# Patient Record
Sex: FEMALE | Race: OTHER | HISPANIC OR LATINO
[De-identification: names, ages, dates, MRNs, and addresses within clinical notes are randomized per-mention and may not be internally consistent; named-entity substitution may affect disease eponyms.]

---

## 2019-01-17 ENCOUNTER — APPOINTMENT (OUTPATIENT)
Dept: SURGERY | Facility: CLINIC | Age: 29
End: 2019-01-17

## 2019-01-25 ENCOUNTER — APPOINTMENT (OUTPATIENT)
Dept: BREAST CENTER | Facility: CLINIC | Age: 29
End: 2019-01-25
Payer: COMMERCIAL

## 2019-01-25 PROCEDURE — 99204 OFFICE O/P NEW MOD 45 MIN: CPT

## 2019-01-29 ENCOUNTER — APPOINTMENT (OUTPATIENT)
Dept: INTERNAL MEDICINE | Facility: CLINIC | Age: 29
End: 2019-01-29

## 2019-02-01 NOTE — PHYSICAL EXAM
[Normocephalic] : normocephalic [Atraumatic] : atraumatic [EOMI] : extra ocular movement intact [PERRL] : pupils equal, round and reactive to light [Sclera nonicteric] : sclera nonicteric [Supple] : supple [No Supraclavicular Adenopathy] : no supraclavicular adenopathy [Examined in the supine and seated position] : examined in the supine and seated position [No dominant masses] : no dominant masses in right breast  [No dominant masses] : no dominant masses left breast [No Nipple Retraction] : no left nipple retraction [No Nipple Discharge] : no left nipple discharge [Breast Nipple Inversion] : nipples not inverted [Breast Nipple Retraction] : nipples not retracted [Breast Nipple Flattening] : nipples not flattened [Breast Nipple Fissures] : nipples not fissured [Breast Abnormal Lactation (Galactorrhea)] : no galactorrhea [Breast Abnormal Secretion Bloody Fluid] : no bloody discharge [Breast Abnormal Secretion Serous Fluid] : no serous discharge [Breast Abnormal Secretion Opalescent Fluid] : no milky discharge [No Axillary Lymphadenopathy] : no left axillary lymphadenopathy [No Edema] : no edema [No Rashes] : no rashes [No Ulceration] : no ulceration [de-identified] : No supraclavicular or axillary adenopathy. No dominant masses, normal to palpation. Everted nipple without discharge. No skin changes.\par \par  [de-identified] : No supraclavicular or axillary adenopathy. No dominant masses, normal to palpation. Everted nipple without discharge. No skin changes.\par \par

## 2019-02-01 NOTE — ASSESSMENT
[FreeTextEntry1] : 29 yo female presents with palpable right breast lump.  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  There is no dominant masses palpable on exam today.  Recommendation for repeat ultrasound in March 2019 and followup in 6 months.\par 1.  Bilateral breast ultrasound in 6 months\par 2.  Follow up in 6 months\par 3.  Annual screening mammogram to begin at age 38

## 2019-02-01 NOTE — PAST MEDICAL HISTORY
[Menstruating] : The patient is menstruating [Menarche Age ____] : age at menarche was [unfilled] [Total Preg ___] : G[unfilled] [Live Births ___] : P[unfilled]  [Age At Live Birth ___] : Age at live birth: [unfilled]
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
## 2019-02-06 ENCOUNTER — APPOINTMENT (OUTPATIENT)
Dept: INTERNAL MEDICINE | Facility: CLINIC | Age: 29
End: 2019-02-06
Payer: COMMERCIAL

## 2019-02-06 VITALS
SYSTOLIC BLOOD PRESSURE: 122 MMHG | BODY MASS INDEX: 26.68 KG/M2 | DIASTOLIC BLOOD PRESSURE: 81 MMHG | WEIGHT: 145 LBS | HEIGHT: 62 IN

## 2019-02-06 PROCEDURE — 99204 OFFICE O/P NEW MOD 45 MIN: CPT

## 2019-02-06 NOTE — ASSESSMENT
[FreeTextEntry1] : We had a long discussion about herpes virus family behavior that can stay dormant in body and causes issue with immunosuppression otherwise positive serology means contact in the past. \par Unless she develops HIV, cancer, need for transplant, ..... it doesn’t cause any issue. \par She was reassured. \par RTC PRN

## 2019-02-06 NOTE — PHYSICAL EXAM
[General Appearance - Alert] : alert [General Appearance - In No Acute Distress] : in no acute distress [Sclera] : the sclera and conjunctiva were normal [PERRL With Normal Accommodation] : pupils were equal in size, round, reactive to light [Extraocular Movements] : extraocular movements were intact [Outer Ear] : the ears and nose were normal in appearance [Oropharynx] : the oropharynx was normal with no thrush [Neck Appearance] : the appearance of the neck was normal [Neck Cervical Mass (___cm)] : no neck mass was observed [Jugular Venous Distention Increased] : there was no jugular-venous distention [Thyroid Diffuse Enlargement] : the thyroid was not enlarged [Auscultation Breath Sounds / Voice Sounds] : lungs were clear to auscultation bilaterally [Heart Rate And Rhythm] : heart rate was normal and rhythm regular [Heart Sounds] : normal S1 and S2 [Heart Sounds Gallop] : no gallops [Murmurs] : no murmurs [Heart Sounds Pericardial Friction Rub] : no pericardial rub [Full Pulse] : the pedal pulses are present [Edema] : there was no peripheral edema [Bowel Sounds] : normal bowel sounds [Abdomen Soft] : soft [Abdomen Tenderness] : non-tender [Abdomen Mass (___ Cm)] : no abdominal mass palpated [Costovertebral Angle Tenderness] : no CVA tenderness [No Palpable Adenopathy] : no palpable adenopathy [Musculoskeletal - Swelling] : no joint swelling [Nail Clubbing] : no clubbing  or cyanosis of the fingernails [Motor Tone] : muscle strength and tone were normal [Skin Color & Pigmentation] : normal skin color and pigmentation [] : no rash [Deep Tendon Reflexes (DTR)] : deep tendon reflexes were 2+ and symmetric [Sensation] : the sensory exam was normal to light touch and pinprick [No Focal Deficits] : no focal deficits [Oriented To Time, Place, And Person] : oriented to person, place, and time [Affect] : the affect was normal

## 2019-02-06 NOTE — HISTORY OF PRESENT ILLNESS
[FreeTextEntry1] : 27y/o woman with history of anxiety/depression was referred to ID for a positive CMV serology. \par She was seen by her PCP for neck lymphadenopathy (that has resolved) and on/off joint pain and had complete labs. ALT was 45 so complete tick born disease, Hepatitis panel and CMV and EBV serology were done. EBV came back old infection and CMV serology IgG and IgM positive. Hep panel neg. \par She came here for CMV+ serology. \par No symptoms, no fever. no lymphadenopathy at his time. Joint pain is all gone. She is just worried about her health. \par No immune system diseases, HIV neg and no cancer or chemo.

## 2019-02-09 ENCOUNTER — APPOINTMENT (OUTPATIENT)
Dept: ULTRASOUND IMAGING | Facility: CLINIC | Age: 29
End: 2019-02-09

## 2019-03-11 ENCOUNTER — APPOINTMENT (OUTPATIENT)
Dept: ULTRASOUND IMAGING | Facility: CLINIC | Age: 29
End: 2019-03-11
Payer: COMMERCIAL

## 2019-03-11 ENCOUNTER — OUTPATIENT (OUTPATIENT)
Dept: OUTPATIENT SERVICES | Facility: HOSPITAL | Age: 29
LOS: 1 days | End: 2019-03-11
Payer: COMMERCIAL

## 2019-03-11 DIAGNOSIS — Z00.8 ENCOUNTER FOR OTHER GENERAL EXAMINATION: ICD-10-CM

## 2019-03-11 PROCEDURE — 76641 ULTRASOUND BREAST COMPLETE: CPT

## 2019-03-11 PROCEDURE — 76641 ULTRASOUND BREAST COMPLETE: CPT | Mod: 26,50

## 2019-06-19 ENCOUNTER — APPOINTMENT (OUTPATIENT)
Dept: INTERNAL MEDICINE | Facility: CLINIC | Age: 29
End: 2019-06-19
Payer: COMMERCIAL

## 2019-06-19 VITALS
DIASTOLIC BLOOD PRESSURE: 60 MMHG | WEIGHT: 147 LBS | BODY MASS INDEX: 27.05 KG/M2 | SYSTOLIC BLOOD PRESSURE: 120 MMHG | HEIGHT: 62 IN

## 2019-06-19 PROCEDURE — 99213 OFFICE O/P EST LOW 20 MIN: CPT | Mod: 25

## 2019-06-19 PROCEDURE — 36415 COLL VENOUS BLD VENIPUNCTURE: CPT

## 2019-06-19 NOTE — HISTORY OF PRESENT ILLNESS
[FreeTextEntry1] : 29y/o woman with history of anxiety/depression who was seen few months ago for a positive CMV serology, is here for follow up with the same complaint. \par She was seen by her PCP for neck lymphadenopathy (that has resolved) and on/off joint pain and had complete labs. ALT was 45 so complete tick born disease, Hepatitis panel and CMV and EBV serology were done. EBV came back old infection and CMV serology IgG and IgM positive. Hep panel neg. \par No symptoms, no fever. no lymphadenopathy at his time. Joint pain is all gone. She is just worried about her health. \par No immune system diseases, HIV neg and no cancer or chemo.

## 2019-06-19 NOTE — ASSESSMENT
[Anticipatory Guidance] : anticipatory guidance [FreeTextEntry1] : I don't believe she has infectious causes for her fatigue but will repeat labs to make sure doesn’t have any Lyme, IMN and CMV. \par It could be her anxiety and possibly fibromyalgia. \par She had some rheum work up as well that were neg. \par Will see her in few weeks for results. \par

## 2019-06-19 NOTE — PHYSICAL EXAM
[General Appearance - Alert] : alert [General Appearance - In No Acute Distress] : in no acute distress [Sclera] : the sclera and conjunctiva were normal [PERRL With Normal Accommodation] : pupils were equal in size, round, reactive to light [Extraocular Movements] : extraocular movements were intact [Outer Ear] : the ears and nose were normal in appearance [Neck Cervical Mass (___cm)] : no neck mass was observed [Neck Appearance] : the appearance of the neck was normal [Oropharynx] : the oropharynx was normal with no thrush [Jugular Venous Distention Increased] : there was no jugular-venous distention [Thyroid Diffuse Enlargement] : the thyroid was not enlarged [Heart Sounds] : normal S1 and S2 [Auscultation Breath Sounds / Voice Sounds] : lungs were clear to auscultation bilaterally [Heart Rate And Rhythm] : heart rate was normal and rhythm regular [Murmurs] : no murmurs [Heart Sounds Pericardial Friction Rub] : no pericardial rub [Heart Sounds Gallop] : no gallops [Bowel Sounds] : normal bowel sounds [Full Pulse] : the pedal pulses are present [Edema] : there was no peripheral edema [Abdomen Soft] : soft [Abdomen Tenderness] : non-tender [Costovertebral Angle Tenderness] : no CVA tenderness [No Palpable Adenopathy] : no palpable adenopathy [Abdomen Mass (___ Cm)] : no abdominal mass palpated [Nail Clubbing] : no clubbing  or cyanosis of the fingernails [Musculoskeletal - Swelling] : no joint swelling [Deep Tendon Reflexes (DTR)] : deep tendon reflexes were 2+ and symmetric [Skin Color & Pigmentation] : normal skin color and pigmentation [Motor Tone] : muscle strength and tone were normal [] : no rash [Oriented To Time, Place, And Person] : oriented to person, place, and time [Sensation] : the sensory exam was normal to light touch and pinprick [No Focal Deficits] : no focal deficits [Affect] : the affect was normal

## 2019-06-28 LAB
ANA SER IF-ACNC: NEGATIVE
CMV IGG SERPL QL: >10 U/ML
CMV IGG SERPL-IMP: POSITIVE
CRP SERPL-MCNC: <0.1 MG/DL
HETEROPH AB SER QL: NEGATIVE

## 2019-07-10 ENCOUNTER — RX RENEWAL (OUTPATIENT)
Age: 29
End: 2019-07-10

## 2019-07-10 LAB
B BURGDOR AB SER-IMP: NEGATIVE
B BURGDOR IGM PATRN SER IB-IMP: NEGATIVE
B BURGDOR18KD IGG SER QL IB: NORMAL
B BURGDOR23KD IGG SER QL IB: NORMAL
B BURGDOR23KD IGM SER QL IB: NORMAL
B BURGDOR28KD IGG SER QL IB: NORMAL
B BURGDOR30KD IGG SER QL IB: NORMAL
B BURGDOR31KD IGG SER QL IB: NORMAL
B BURGDOR39KD IGG SER QL IB: NORMAL
B BURGDOR39KD IGM SER QL IB: NORMAL
B BURGDOR41KD IGG SER QL IB: PRESENT
B BURGDOR41KD IGM SER QL IB: PRESENT
B BURGDOR45KD IGG SER QL IB: NORMAL
B BURGDOR58KD IGG SER QL IB: NORMAL
B BURGDOR66KD IGG SER QL IB: NORMAL
B BURGDOR93KD IGG SER QL IB: NORMAL

## 2019-07-16 ENCOUNTER — APPOINTMENT (OUTPATIENT)
Dept: SURGERY | Facility: CLINIC | Age: 29
End: 2019-07-16

## 2019-10-01 ENCOUNTER — APPOINTMENT (OUTPATIENT)
Dept: SURGERY | Facility: CLINIC | Age: 29
End: 2019-10-01

## 2020-09-09 ENCOUNTER — APPOINTMENT (OUTPATIENT)
Dept: INTERNAL MEDICINE | Facility: CLINIC | Age: 30
End: 2020-09-09

## 2020-12-17 ENCOUNTER — APPOINTMENT (OUTPATIENT)
Dept: OBGYN | Facility: CLINIC | Age: 30
End: 2020-12-17

## 2021-03-22 ENCOUNTER — APPOINTMENT (OUTPATIENT)
Dept: OBGYN | Facility: CLINIC | Age: 31
End: 2021-03-22
Payer: COMMERCIAL

## 2021-03-22 PROCEDURE — ZZZZZ: CPT

## 2021-04-07 ENCOUNTER — APPOINTMENT (OUTPATIENT)
Dept: ANTEPARTUM | Facility: CLINIC | Age: 31
End: 2021-04-07
Payer: COMMERCIAL

## 2021-04-07 PROCEDURE — 99072 ADDL SUPL MATRL&STAF TM PHE: CPT

## 2021-04-07 PROCEDURE — 76801 OB US < 14 WKS SINGLE FETUS: CPT

## 2021-04-18 ENCOUNTER — EMERGENCY (EMERGENCY)
Facility: HOSPITAL | Age: 31
LOS: 1 days | Discharge: HOME | End: 2021-04-18
Admitting: EMERGENCY MEDICINE
Payer: COMMERCIAL

## 2021-04-18 VITALS
SYSTOLIC BLOOD PRESSURE: 112 MMHG | OXYGEN SATURATION: 100 % | TEMPERATURE: 98 F | DIASTOLIC BLOOD PRESSURE: 70 MMHG | HEART RATE: 92 BPM | RESPIRATION RATE: 16 BRPM

## 2021-04-18 PROCEDURE — 99285 EMERGENCY DEPT VISIT HI MDM: CPT

## 2021-04-18 NOTE — ED PROVIDER NOTE - CLINICAL SUMMARY MEDICAL DECISION MAKING FREE TEXT BOX
29 y/o female 11.5 weeks pregnant w/ spotting x1 time today, + IUP 2 weeks ago. Spoke with GYN resident, ok for FHR and DC. Pt to see Jessica Mock in 2 days. Advised to come back to the ER if bleeding or pain becomes worse.

## 2021-04-18 NOTE — ED PROVIDER NOTE - PATIENT PORTAL LINK FT
You can access the FollowMyHealth Patient Portal offered by [facility] by registering at the following website: [email or web address]. By joining [employer]’s FollowMyHealth portal, you will also be able to view your health information using other applications (apps) compatible with our system.

## 2021-04-18 NOTE — ED ADULT TRIAGE NOTE - CHIEF COMPLAINT QUOTE
Pt. 12wks pregnant, c/o vaginal spotting, lower abdominal pressure and lower back pain starting today. Advised to come to ER for US. Denies n/v. h/o miscarriage 10 years ago. EDINSON 11/4/21.

## 2021-04-18 NOTE — ED PROVIDER NOTE - OBJECTIVE STATEMENT
31 y/o female, , 11.5 weeks pregnant c/o spotting x today. Pt admits to noticing small amount of spotting this AM. Pt called her OB Dr. Lopez who stated that pt can come to the office in 2 days for f/u but rec to go to the ER for FHR check to make sure everything is ok. Pt denies abd cramping, heavy bleeding, dizziness, syncope, fever or chills.

## 2021-04-20 ENCOUNTER — APPOINTMENT (OUTPATIENT)
Dept: ANTEPARTUM | Facility: CLINIC | Age: 31
End: 2021-04-20
Payer: COMMERCIAL

## 2021-04-20 PROCEDURE — 76801 OB US < 14 WKS SINGLE FETUS: CPT

## 2021-04-20 PROCEDURE — 99072 ADDL SUPL MATRL&STAF TM PHE: CPT

## 2021-05-19 ENCOUNTER — APPOINTMENT (OUTPATIENT)
Dept: OBGYN | Facility: CLINIC | Age: 31
End: 2021-05-19
Payer: COMMERCIAL

## 2021-05-19 ENCOUNTER — NON-APPOINTMENT (OUTPATIENT)
Age: 31
End: 2021-05-19

## 2021-05-19 PROCEDURE — 0502F SUBSEQUENT PRENATAL CARE: CPT

## 2021-06-16 ENCOUNTER — APPOINTMENT (OUTPATIENT)
Dept: ANTEPARTUM | Facility: CLINIC | Age: 31
End: 2021-06-16
Payer: COMMERCIAL

## 2021-06-16 PROCEDURE — 76805 OB US >/= 14 WKS SNGL FETUS: CPT

## 2021-06-16 PROCEDURE — 99072 ADDL SUPL MATRL&STAF TM PHE: CPT

## 2021-07-21 ENCOUNTER — APPOINTMENT (OUTPATIENT)
Dept: OBGYN | Facility: CLINIC | Age: 31
End: 2021-07-21
Payer: COMMERCIAL

## 2021-07-21 PROCEDURE — 0502F SUBSEQUENT PRENATAL CARE: CPT

## 2021-07-29 ENCOUNTER — OUTPATIENT (OUTPATIENT)
Dept: INPATIENT UNIT | Facility: HOSPITAL | Age: 31
LOS: 1 days | Discharge: HOME | End: 2021-07-29

## 2021-07-29 VITALS
DIASTOLIC BLOOD PRESSURE: 60 MMHG | TEMPERATURE: 98 F | SYSTOLIC BLOOD PRESSURE: 118 MMHG | RESPIRATION RATE: 16 BRPM | HEART RATE: 83 BPM

## 2021-07-29 VITALS — DIASTOLIC BLOOD PRESSURE: 54 MMHG | SYSTOLIC BLOOD PRESSURE: 107 MMHG | HEART RATE: 83 BPM | TEMPERATURE: 98 F

## 2021-07-29 DIAGNOSIS — Z98.890 OTHER SPECIFIED POSTPROCEDURAL STATES: Chronic | ICD-10-CM

## 2021-07-29 DIAGNOSIS — Z3A.00 WEEKS OF GESTATION OF PREGNANCY NOT SPECIFIED: ICD-10-CM

## 2021-07-29 DIAGNOSIS — Z98.891 HISTORY OF UTERINE SCAR FROM PREVIOUS SURGERY: Chronic | ICD-10-CM

## 2021-07-29 DIAGNOSIS — O26.899 OTHER SPECIFIED PREGNANCY RELATED CONDITIONS, UNSPECIFIED TRIMESTER: ICD-10-CM

## 2021-07-29 LAB
APPEARANCE UR: CLEAR — SIGNIFICANT CHANGE UP
APTT BLD: 26.4 SEC — LOW (ref 27–36.3)
BACTERIA # UR AUTO: ABNORMAL
BILIRUB UR-MCNC: NEGATIVE — SIGNIFICANT CHANGE UP
BLD GP AB SCN SERPL QL: NEGATIVE — SIGNIFICANT CHANGE UP
COLOR SPEC: SIGNIFICANT CHANGE UP
DIFF PNL FLD: NEGATIVE — SIGNIFICANT CHANGE UP
EPI CELLS # UR: 3 /HPF — SIGNIFICANT CHANGE UP (ref 0–5)
FIBRINOGEN PPP-MCNC: 468 MG/DL — SIGNIFICANT CHANGE UP (ref 290–520)
GLUCOSE UR QL: NEGATIVE — SIGNIFICANT CHANGE UP
HCT VFR BLD CALC: 36.4 % — SIGNIFICANT CHANGE UP (ref 34.5–45)
HGB BLD-MCNC: 12.4 G/DL — SIGNIFICANT CHANGE UP (ref 11.5–15.5)
HYALINE CASTS # UR AUTO: 2 /LPF — SIGNIFICANT CHANGE UP (ref 0–7)
INR BLD: 0.97 RATIO — SIGNIFICANT CHANGE UP (ref 0.88–1.16)
KETONES UR-MCNC: NEGATIVE — SIGNIFICANT CHANGE UP
LEUKOCYTE ESTERASE UR-ACNC: ABNORMAL
MCHC RBC-ENTMCNC: 30.2 PG — SIGNIFICANT CHANGE UP (ref 27–34)
MCHC RBC-ENTMCNC: 34.1 GM/DL — SIGNIFICANT CHANGE UP (ref 32–36)
MCV RBC AUTO: 88.8 FL — SIGNIFICANT CHANGE UP (ref 80–100)
NITRITE UR-MCNC: NEGATIVE — SIGNIFICANT CHANGE UP
NRBC # BLD: 0 /100 WBCS — SIGNIFICANT CHANGE UP
NRBC # FLD: 0 K/UL — SIGNIFICANT CHANGE UP
PH UR: 7.5 — SIGNIFICANT CHANGE UP (ref 5–8)
PLATELET # BLD AUTO: 245 K/UL — SIGNIFICANT CHANGE UP (ref 150–400)
PROT UR-MCNC: NEGATIVE — SIGNIFICANT CHANGE UP
PROTHROM AB SERPL-ACNC: 11.1 SEC — SIGNIFICANT CHANGE UP (ref 10.6–13.6)
RBC # BLD: 4.1 M/UL — SIGNIFICANT CHANGE UP (ref 3.8–5.2)
RBC # FLD: 12.8 % — SIGNIFICANT CHANGE UP (ref 10.3–14.5)
RBC CASTS # UR COMP ASSIST: 1 /HPF — SIGNIFICANT CHANGE UP (ref 0–4)
RH IG SCN BLD-IMP: POSITIVE — SIGNIFICANT CHANGE UP
SP GR SPEC: 1.01 — LOW (ref 1.01–1.02)
UROBILINOGEN FLD QL: SIGNIFICANT CHANGE UP
WBC # BLD: 9.4 K/UL — SIGNIFICANT CHANGE UP (ref 3.8–10.5)
WBC # FLD AUTO: 9.4 K/UL — SIGNIFICANT CHANGE UP (ref 3.8–10.5)
WBC UR QL: 7 /HPF — HIGH (ref 0–5)
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
## 2021-07-29 NOTE — OB PROVIDER TRIAGE NOTE - ADDITIONAL INSTRUCTIONS
d/w Dr Loyola d/c home s/p fall no complaints no evidence of PTL or abruption @ 25 weeks  maternal and fetal surveillance reassuring  rest activity as tolerated  increase water intake  keep all OB appointments  may take tylenol by mouth as needed for pain  return for vaginal bleeding leakage of fluid decreased fetal movement or any concerns  v/w discharge instructions given with verbal understanding by patient

## 2021-07-29 NOTE — OB PROVIDER TRIAGE NOTE - NSHPLABSRESULTS_GEN_ALL_CORE
Urinalysis Basic - ( 2021 20:06 )    Color: Light Yellow / Appearance: Clear / S.009 / pH: x  Gluc: x / Ketone: Negative  / Bili: Negative / Urobili: <2 mg/dL   Blood: x / Protein: Negative / Nitrite: Negative   Leuk Esterase: Small / RBC: 1 /HPF / WBC 7 /HPF   Sq Epi: x / Non Sq Epi: 3 /HPF / Bacteria: Moderate                          12.4   9.40  )-----------( 245      ( 2021 20:06 )             36.4   PT/INR - ( 2021 20:06 )   PT: 11.1 sec;   INR: 0.97 ratio         PTT - ( 2021 20:06 )  PTT:26.4 sec
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
## 2021-07-29 NOTE — OB PROVIDER TRIAGE NOTE - NSHPPHYSICALEXAM_GEN_ALL_CORE
LS clear bilaterally  abd soft gravid NT  CV RRR  FHT: moderate variability, +accels, negative decels  toco: none  Vital Signs Last 24 Hrs  T(C): 36.6 (29 Jul 2021 19:27), Max: 36.6 (29 Jul 2021 19:27)  T(F): 97.88 (29 Jul 2021 19:27), Max: 97.88 (29 Jul 2021 19:27)  HR: 83 (29 Jul 2021 19:27) (83 - 83)  BP: 107/54 (29 Jul 2021 19:27) (107/54 - 118/60)  BP(mean): --  RR: 16 (29 Jul 2021 19:03) (16 - 16)  SpO2: -- LS clear bilaterally  abd soft gravid NT  CV RRR  TAS: vertex  anterior placenta  +FH +FM  ZACKARY: MFP:>3  EFW: 1#13/822g  SSE: cx appears closed, no bleeding or pooling of fluid noted  FHT: moderate variability, +accels, negative decels  toco: 2 contractions in 4 hr, not felt denies pain 0/10 on pain scale  Vital Signs Last 24 Hrs  T(C): 36.6 (29 Jul 2021 19:27), Max: 36.6 (29 Jul 2021 19:27)  T(F): 97.88 (29 Jul 2021 19:27), Max: 97.88 (29 Jul 2021 19:27)  HR: 83 (29 Jul 2021 19:27) (83 - 83)  BP: 107/54 (29 Jul 2021 19:27) (107/54 - 118/60)  BP(mean): --  RR: 16 (29 Jul 2021 19:03) (16 - 16)  SpO2: --

## 2021-08-11 ENCOUNTER — APPOINTMENT (OUTPATIENT)
Dept: OBGYN | Facility: CLINIC | Age: 31
End: 2021-08-11
Payer: COMMERCIAL

## 2021-08-11 PROCEDURE — 0502F SUBSEQUENT PRENATAL CARE: CPT

## 2021-08-25 ENCOUNTER — APPOINTMENT (OUTPATIENT)
Dept: ANTEPARTUM | Facility: CLINIC | Age: 31
End: 2021-08-25
Payer: COMMERCIAL

## 2021-08-25 VITALS — BODY MASS INDEX: 33.11 KG/M2 | SYSTOLIC BLOOD PRESSURE: 128 MMHG | WEIGHT: 181 LBS | DIASTOLIC BLOOD PRESSURE: 73 MMHG

## 2021-08-25 PROCEDURE — 76819 FETAL BIOPHYS PROFIL W/O NST: CPT

## 2021-08-25 PROCEDURE — 76816 OB US FOLLOW-UP PER FETUS: CPT

## 2021-09-08 ENCOUNTER — APPOINTMENT (OUTPATIENT)
Dept: OBGYN | Facility: CLINIC | Age: 31
End: 2021-09-08

## 2021-09-13 ENCOUNTER — OUTPATIENT (OUTPATIENT)
Dept: INPATIENT UNIT | Facility: HOSPITAL | Age: 31
LOS: 1 days | Discharge: HOME | End: 2021-09-13
Payer: COMMERCIAL

## 2021-09-13 VITALS
RESPIRATION RATE: 16 BRPM | TEMPERATURE: 98 F | HEART RATE: 85 BPM | SYSTOLIC BLOOD PRESSURE: 99 MMHG | DIASTOLIC BLOOD PRESSURE: 53 MMHG

## 2021-09-13 DIAGNOSIS — Z98.890 OTHER SPECIFIED POSTPROCEDURAL STATES: Chronic | ICD-10-CM

## 2021-09-13 DIAGNOSIS — O26.899 OTHER SPECIFIED PREGNANCY RELATED CONDITIONS, UNSPECIFIED TRIMESTER: ICD-10-CM

## 2021-09-13 DIAGNOSIS — Z98.891 HISTORY OF UTERINE SCAR FROM PREVIOUS SURGERY: Chronic | ICD-10-CM

## 2021-09-13 DIAGNOSIS — Z3A.00 WEEKS OF GESTATION OF PREGNANCY NOT SPECIFIED: ICD-10-CM

## 2021-09-13 NOTE — OB RN TRIAGE NOTE - NSICDXPASTMEDICALHX_GEN_ALL_CORE_FT
PAST MEDICAL HISTORY:  HSV-1 infection     Rheumatoid arthritis On Planquinil for last few months

## 2021-09-13 NOTE — OB PROVIDER TRIAGE NOTE - ADDITIONAL INSTRUCTIONS
- Low ZACKARY, pt instructed to follow up in Dr. Lopez's office on Thursday  - increase hydration  - fetal kick count  - S/S of PTL  - maternity belt

## 2021-09-13 NOTE — OB PROVIDER TRIAGE NOTE - NSHPPHYSICALEXAM_GEN_ALL_CORE
Alert and Oriented x 3  Lung CTAB  Heart RRR  Abdomen gravid soft and nontender    FHR: 125  Contractions: none   CAT I     Sono:  BPP 8/8  presentation: vertex    placenta: anterior   ZACKARY: 6.19    Speculum Exam:  small 0.5cm cervical polyp in cervical os  cervix appears closed     Vaginal Exam: 0/20/-4    Vital Signs Last 24 Hrs  T(C): 36.7 (13 Sep 2021 21:50), Max: 36.7 (13 Sep 2021 21:50)  T(F): 98.1 (13 Sep 2021 21:50), Max: 98.1 (13 Sep 2021 21:50)  HR: 88 (14 Sep 2021 00:01) (84 - 88)  BP: 105/54 (14 Sep 2021 00:01) (99/53 - 113/54)  RR: 16 (13 Sep 2021 21:50) (16 - 16)

## 2021-09-13 NOTE — OB RN TRIAGE NOTE - NS_OBGYNHISTORY_OBGYN_ALL_OB_FT
7/13/2009 c/s from  Non reassuring tracing, Cord Around the neck ,  and Pre-Eclampsia FT M 7#13  SAB X1 with no D&C complete

## 2021-09-13 NOTE — OB PROVIDER TRIAGE NOTE - NSOBPROVIDERNOTE_OBGYN_ALL_OB_FT
Discussed with Dr. Harkins - no evidence of PTL noted  - Low ZACKARY, pt instructed to follow up in Dr. Lopez's office on Thursday  - increase hydration  - fetal kick count  - S/S of PTL  - maternity belt

## 2021-09-13 NOTE — OB PROVIDER TRIAGE NOTE - NSHPLABSRESULTS_GEN_ALL_CORE
Urinalysis Basic - ( 14 Sep 2021 00:05 )    Color: Light Yellow / Appearance: Slightly Turbid / S.009 / pH: x  Gluc: x / Ketone: Negative  / Bili: Negative / Urobili: <2 mg/dL   Blood: x / Protein: Negative / Nitrite: Negative   Leuk Esterase: Moderate / RBC: 1 /HPF / WBC 12 /HPF   Sq Epi: x / Non Sq Epi: 5 /HPF / Bacteria: Moderate

## 2021-09-13 NOTE — OB PROVIDER TRIAGE NOTE - HISTORY OF PRESENT ILLNESS
29 y/o F  @ 32.5 weeks  presented to triage with pain in her lower abdomen. Pt states she feel like she pulled something and it hurts to walk. Pt also feels like someone is stretching my muscle. Pt states the pain is intermittent and rates it a 5/10 on the pain scale. Pt endorses +FM. Denies any LOF, VB or cramping at the moment.     AP: anemia   PMH: + RA markers,          + HSV I           hx: anxiety- therapist in the past        31 y/o F  @ 32.5 weeks  presented to triage with pain in her lower abdomen. Pt states she feel like she pulled something and it hurts to walk. Pt also feels like someone is stretching my muscle. Pt states the pain is intermittent and rates it a 5/10 on the pain scale. Pt endorses +FM. Denies any LOF, VB or cramping at the moment.     AP: anemia   PMH: + RA markers on Plaquenil 200mg BID           + HSV I           hx: anxiety- therapist in the past

## 2021-09-14 VITALS — SYSTOLIC BLOOD PRESSURE: 105 MMHG | DIASTOLIC BLOOD PRESSURE: 54 MMHG | HEART RATE: 88 BPM

## 2021-09-14 LAB
APPEARANCE UR: ABNORMAL
BACTERIA # UR AUTO: ABNORMAL
BILIRUB UR-MCNC: NEGATIVE — SIGNIFICANT CHANGE UP
COLOR SPEC: SIGNIFICANT CHANGE UP
DIFF PNL FLD: NEGATIVE — SIGNIFICANT CHANGE UP
EPI CELLS # UR: 5 /HPF — SIGNIFICANT CHANGE UP (ref 0–5)
GLUCOSE UR QL: NEGATIVE — SIGNIFICANT CHANGE UP
HYALINE CASTS # UR AUTO: 2 /LPF — SIGNIFICANT CHANGE UP (ref 0–7)
KETONES UR-MCNC: NEGATIVE — SIGNIFICANT CHANGE UP
LEUKOCYTE ESTERASE UR-ACNC: ABNORMAL
NITRITE UR-MCNC: NEGATIVE — SIGNIFICANT CHANGE UP
PH UR: 7.5 — SIGNIFICANT CHANGE UP (ref 5–8)
PROT UR-MCNC: NEGATIVE — SIGNIFICANT CHANGE UP
RBC CASTS # UR COMP ASSIST: 1 /HPF — SIGNIFICANT CHANGE UP (ref 0–4)
SP GR SPEC: 1.01 — SIGNIFICANT CHANGE UP (ref 1–1.05)
UROBILINOGEN FLD QL: SIGNIFICANT CHANGE UP
WBC UR QL: 12 /HPF — HIGH (ref 0–5)

## 2021-09-14 PROCEDURE — 76817 TRANSVAGINAL US OBSTETRIC: CPT | Mod: 26

## 2021-09-14 PROCEDURE — 76819 FETAL BIOPHYS PROFIL W/O NST: CPT | Mod: 26

## 2021-09-14 PROCEDURE — 99205 OFFICE O/P NEW HI 60 MIN: CPT | Mod: 25

## 2021-09-14 PROCEDURE — 76805 OB US >/= 14 WKS SNGL FETUS: CPT | Mod: 26

## 2021-09-16 ENCOUNTER — APPOINTMENT (OUTPATIENT)
Dept: ANTEPARTUM | Facility: CLINIC | Age: 31
End: 2021-09-16
Payer: COMMERCIAL

## 2021-09-16 ENCOUNTER — APPOINTMENT (OUTPATIENT)
Dept: OBGYN | Facility: CLINIC | Age: 31
End: 2021-09-16
Payer: COMMERCIAL

## 2021-09-16 VITALS — SYSTOLIC BLOOD PRESSURE: 115 MMHG | WEIGHT: 185 LBS | DIASTOLIC BLOOD PRESSURE: 61 MMHG | BODY MASS INDEX: 33.84 KG/M2

## 2021-09-16 PROCEDURE — 76816 OB US FOLLOW-UP PER FETUS: CPT

## 2021-09-16 PROCEDURE — 76819 FETAL BIOPHYS PROFIL W/O NST: CPT

## 2021-09-16 PROCEDURE — 0502F SUBSEQUENT PRENATAL CARE: CPT

## 2021-09-29 ENCOUNTER — APPOINTMENT (OUTPATIENT)
Dept: ANTEPARTUM | Facility: CLINIC | Age: 31
End: 2021-09-29
Payer: COMMERCIAL

## 2021-09-29 VITALS — BODY MASS INDEX: 34.2 KG/M2 | WEIGHT: 187 LBS | DIASTOLIC BLOOD PRESSURE: 76 MMHG | SYSTOLIC BLOOD PRESSURE: 119 MMHG

## 2021-09-29 PROBLEM — B00.9 HERPESVIRAL INFECTION, UNSPECIFIED: Chronic | Status: ACTIVE | Noted: 2021-09-13

## 2021-09-29 PROBLEM — M06.9 RHEUMATOID ARTHRITIS, UNSPECIFIED: Chronic | Status: ACTIVE | Noted: 2021-09-13

## 2021-09-29 PROCEDURE — 0502F SUBSEQUENT PRENATAL CARE: CPT

## 2021-10-05 ENCOUNTER — APPOINTMENT (OUTPATIENT)
Dept: OBGYN | Facility: CLINIC | Age: 31
End: 2021-10-05
Payer: COMMERCIAL

## 2021-10-05 DIAGNOSIS — Z00.00 ENCOUNTER FOR GENERAL ADULT MEDICAL EXAMINATION W/OUT ABNORMAL FINDINGS: ICD-10-CM

## 2021-10-05 PROCEDURE — 36415 COLL VENOUS BLD VENIPUNCTURE: CPT

## 2021-10-06 LAB
ALBUMIN SERPL ELPH-MCNC: 3.6 G/DL
ALP BLD-CCNC: 107 U/L
ALT SERPL-CCNC: 31 U/L
ANION GAP SERPL CALC-SCNC: 14 MMOL/L
AST SERPL-CCNC: 25 U/L
BILIRUB SERPL-MCNC: 0.2 MG/DL
BUN SERPL-MCNC: 7 MG/DL
CALCIUM SERPL-MCNC: 9.8 MG/DL
CHLORIDE SERPL-SCNC: 103 MMOL/L
CO2 SERPL-SCNC: 21 MMOL/L
CREAT SERPL-MCNC: 0.56 MG/DL
GLUCOSE SERPL-MCNC: 158 MG/DL
POTASSIUM SERPL-SCNC: 3.8 MMOL/L
PROT SERPL-MCNC: 6.3 G/DL
SODIUM SERPL-SCNC: 138 MMOL/L

## 2021-10-11 LAB — BILE AC SER-MCNC: 7.2 UMOL/L

## 2021-10-13 ENCOUNTER — APPOINTMENT (OUTPATIENT)
Dept: OBGYN | Facility: CLINIC | Age: 31
End: 2021-10-13

## 2021-10-13 VITALS — DIASTOLIC BLOOD PRESSURE: 76 MMHG | SYSTOLIC BLOOD PRESSURE: 126 MMHG | BODY MASS INDEX: 35.12 KG/M2 | WEIGHT: 192 LBS

## 2021-10-14 LAB — HIV1+2 AB SPEC QL IA.RAPID: NONREACTIVE

## 2021-10-17 LAB — B-HEM STREP SPEC QL CULT: NORMAL

## 2021-10-21 ENCOUNTER — APPOINTMENT (OUTPATIENT)
Dept: OBGYN | Facility: CLINIC | Age: 31
End: 2021-10-21
Payer: COMMERCIAL

## 2021-10-21 ENCOUNTER — APPOINTMENT (OUTPATIENT)
Dept: ANTEPARTUM | Facility: CLINIC | Age: 31
End: 2021-10-21
Payer: COMMERCIAL

## 2021-10-21 ENCOUNTER — OUTPATIENT (OUTPATIENT)
Dept: OUTPATIENT SERVICES | Facility: HOSPITAL | Age: 31
LOS: 1 days | Discharge: HOME | End: 2021-10-21
Payer: COMMERCIAL

## 2021-10-21 VITALS
DIASTOLIC BLOOD PRESSURE: 71 MMHG | HEIGHT: 62 IN | SYSTOLIC BLOOD PRESSURE: 113 MMHG | WEIGHT: 192 LBS | BODY MASS INDEX: 35.33 KG/M2

## 2021-10-21 VITALS
TEMPERATURE: 99 F | SYSTOLIC BLOOD PRESSURE: 136 MMHG | OXYGEN SATURATION: 97 % | DIASTOLIC BLOOD PRESSURE: 60 MMHG | HEART RATE: 92 BPM | RESPIRATION RATE: 16 BRPM

## 2021-10-21 VITALS — HEART RATE: 88 BPM | SYSTOLIC BLOOD PRESSURE: 107 MMHG | DIASTOLIC BLOOD PRESSURE: 57 MMHG

## 2021-10-21 DIAGNOSIS — O26.899 OTHER SPECIFIED PREGNANCY RELATED CONDITIONS, UNSPECIFIED TRIMESTER: ICD-10-CM

## 2021-10-21 DIAGNOSIS — Z98.891 HISTORY OF UTERINE SCAR FROM PREVIOUS SURGERY: Chronic | ICD-10-CM

## 2021-10-21 DIAGNOSIS — Z3A.00 WEEKS OF GESTATION OF PREGNANCY NOT SPECIFIED: ICD-10-CM

## 2021-10-21 DIAGNOSIS — Z98.890 OTHER SPECIFIED POSTPROCEDURAL STATES: Chronic | ICD-10-CM

## 2021-10-21 LAB — SARS-COV-2 RNA SPEC QL NAA+PROBE: SIGNIFICANT CHANGE UP

## 2021-10-21 PROCEDURE — 76819 FETAL BIOPHYS PROFIL W/O NST: CPT

## 2021-10-21 PROCEDURE — 99214 OFFICE O/P EST MOD 30 MIN: CPT

## 2021-10-21 PROCEDURE — 76816 OB US FOLLOW-UP PER FETUS: CPT

## 2021-10-21 PROCEDURE — 76820 UMBILICAL ARTERY ECHO: CPT

## 2021-10-21 PROCEDURE — 76818 FETAL BIOPHYS PROFILE W/NST: CPT | Mod: 26

## 2021-10-21 PROCEDURE — 0502F SUBSEQUENT PRENATAL CARE: CPT

## 2021-10-21 NOTE — OB PROVIDER TRIAGE NOTE - HISTORY OF PRESENT ILLNESS
[facility] Provder: Dr Lopez  EDC: 11/3/2021    Patient is a 30y/o G 3 P 1011 38 1/7@ wks gest who was sent from the office for a repeat c/s for oligohydramnious.  ZACKARY found to be 3.52.  Denies loss of fluid, vaginal bleeding or contractions.  Reports good fetal movements.  Scheduled for repeat c/s on 10/29/2021.  Last ate at 1230; smoothie.    AP Course: states that she was itching and was tested for ICHP- negative  Meds: pnv, iron, vit d, plaquenil 200mg  bid last dose at 1030 & calcium  NKDA    Pt. denies COVID-19 S/S/Infection or  vaccination  Spouse denes covid-19; fully vaccinated.    PMHx- rheumatoid arthritis; Plaquenil   PSHx - c/s          - rt meniscus repair 2019  OBHx - c/s - 9/21/2009 - 7lbs 15oz; nrfht  GYN - HSV1; outbreak in buttocks 2 yrs ago         - uterine cyst  Psych - anxiety; no meds or therapy

## 2021-10-21 NOTE — OB RN TRIAGE NOTE - CHIEF COMPLAINT QUOTE
sent from MD office low fluid, not sure if I'm leaking fluid or have discharge for a few weeks  *scheduled repeat c/s 10/29 if not TOLAC prior*

## 2021-10-21 NOTE — OB PROVIDER TRIAGE NOTE - NSHPPHYSICALEXAM_GEN_ALL_CORE
Vital Signs Last 24 Hrs  T(C): 37.4 (21 Oct 2021 14:49), Max: 37.4 (21 Oct 2021 14:49)  T(F): 99.3 (21 Oct 2021 14:49), Max: 99.3 (21 Oct 2021 14:49)  HR: 88 (21 Oct 2021 17:14) (87 - 98)  BP: 107/57 (21 Oct 2021 17:14) (104/59 - 136/60)  RR: 16 (21 Oct 2021 14:49) (16 - 16)  SpO2: 97% (21 Oct 2021 14:49) (97% - 97%)    Abdomen gravid, soft and nontender  NST - reactive  SVE - c/l/-3  TAS by Dr Gastelum - VTX/ant plac/brissa 6.33

## 2021-10-21 NOTE — OB RN TRIAGE NOTE - NS_OTHER_OBGYN_ALL_OB_FT
pt states she did have outbreak on buttocks 2yr ago; Dr Lopez did prescribe Valtrex last week but pt hasn't started yet

## 2021-10-21 NOTE — OB PROVIDER TRIAGE NOTE - NSOBPROVIDERNOTE_OBGYN_ALL_OB_FT
Discussed findings with Dr Nirav Gastelum - brissa check 6.33.    Plan:  patient is cleared for discharge home.  To return to D&T on Sat 10/23/21 for a fluid check.  Patient instructed to return before then if contractions, lof or vaginal bleeding.

## 2021-10-21 NOTE — OB PROVIDER TRIAGE NOTE - ADDITIONAL INSTRUCTIONS
Discussed findings with Dr Gastelum  Plan:  patient is cleared for discharge home.  To return to D&T on Sat 10/23/21 for a fluid check

## 2021-10-21 NOTE — OB PROVIDER TRIAGE NOTE - ATTENDING COMMENTS
Pt seen and examined by me.  Bedside sono confirmed ZACKARY almost 7cm.  Pt to return to triage for rpt BPP on Saturday.  Precautions reviewed.

## 2021-10-22 ENCOUNTER — OUTPATIENT (OUTPATIENT)
Dept: OUTPATIENT SERVICES | Facility: HOSPITAL | Age: 31
LOS: 1 days | End: 2021-10-22

## 2021-10-22 DIAGNOSIS — Z98.890 OTHER SPECIFIED POSTPROCEDURAL STATES: Chronic | ICD-10-CM

## 2021-10-22 DIAGNOSIS — Z98.891 HISTORY OF UTERINE SCAR FROM PREVIOUS SURGERY: Chronic | ICD-10-CM

## 2021-10-22 DIAGNOSIS — O34.211 MATERNAL CARE FOR LOW TRANSVERSE SCAR FROM PREVIOUS CESAREAN DELIVERY: ICD-10-CM

## 2021-10-22 LAB
APPEARANCE UR: CLEAR — SIGNIFICANT CHANGE UP
BILIRUB UR-MCNC: NEGATIVE — SIGNIFICANT CHANGE UP
BLD GP AB SCN SERPL QL: NEGATIVE — SIGNIFICANT CHANGE UP
COLOR SPEC: SIGNIFICANT CHANGE UP
DIFF PNL FLD: NEGATIVE — SIGNIFICANT CHANGE UP
GLUCOSE UR QL: NEGATIVE — SIGNIFICANT CHANGE UP
HCT VFR BLD CALC: 38.2 % — SIGNIFICANT CHANGE UP (ref 34.5–45)
HGB BLD-MCNC: 12.7 G/DL — SIGNIFICANT CHANGE UP (ref 11.5–15.5)
KETONES UR-MCNC: NEGATIVE — SIGNIFICANT CHANGE UP
LEUKOCYTE ESTERASE UR-ACNC: NEGATIVE — SIGNIFICANT CHANGE UP
MCHC RBC-ENTMCNC: 29.6 PG — SIGNIFICANT CHANGE UP (ref 27–34)
MCHC RBC-ENTMCNC: 33.2 GM/DL — SIGNIFICANT CHANGE UP (ref 32–36)
MCV RBC AUTO: 89 FL — SIGNIFICANT CHANGE UP (ref 80–100)
NITRITE UR-MCNC: NEGATIVE — SIGNIFICANT CHANGE UP
NRBC # BLD: 0 /100 WBCS — SIGNIFICANT CHANGE UP
NRBC # FLD: 0 K/UL — SIGNIFICANT CHANGE UP
PH UR: 7 — SIGNIFICANT CHANGE UP (ref 5–8)
PLATELET # BLD AUTO: 260 K/UL — SIGNIFICANT CHANGE UP (ref 150–400)
PROT UR-MCNC: NEGATIVE — SIGNIFICANT CHANGE UP
RBC # BLD: 4.29 M/UL — SIGNIFICANT CHANGE UP (ref 3.8–5.2)
RBC # FLD: 12.8 % — SIGNIFICANT CHANGE UP (ref 10.3–14.5)
RH IG SCN BLD-IMP: POSITIVE — SIGNIFICANT CHANGE UP
SP GR SPEC: 1.01 — SIGNIFICANT CHANGE UP (ref 1–1.05)
UROBILINOGEN FLD QL: SIGNIFICANT CHANGE UP
WBC # BLD: 9.84 K/UL — SIGNIFICANT CHANGE UP (ref 3.8–10.5)
WBC # FLD AUTO: 9.84 K/UL — SIGNIFICANT CHANGE UP (ref 3.8–10.5)

## 2021-10-22 RX ORDER — ACETAMINOPHEN 500 MG
0 TABLET ORAL
Qty: 0 | Refills: 0 | DISCHARGE

## 2021-10-22 RX ORDER — BENZOYL PEROXIDE MICRONIZED 5.8 %
1 TOWELETTE (EA) TOPICAL
Qty: 0 | Refills: 0 | DISCHARGE

## 2021-10-22 RX ORDER — ERGOCALCIFEROL 1.25 MG/1
1 CAPSULE ORAL
Qty: 0 | Refills: 0 | DISCHARGE

## 2021-10-22 NOTE — OB PST NOTE - ASSESSMENT
31 yrs old female 38 wks and 2 days pregnant . Pt had a miscarriage  at 5wks of pregnancy. Pt had c section in . Pt presents for preop to have repeat  C section

## 2021-10-22 NOTE — OB PST NOTE - NSANTHAGERD_ENT_A_CORE
Baby is a 38+4 wk GA Male born to a 34 y/o  mother via . Maternal history uncomplicated. Prenatal history uncomplicated. Maternal BT A+. PNL neg, NR, and immune. GBS neg on . AROM at 12:10 on 9/10, clear fluids. Baby born vigorous and crying spontaneously. Tight nuchal x2. Delayed cord clamping for 60 seconds. WDSS. Apgars 8/9 for color. EOS 0.10. Mom plans to breastfeed, would like hepB vaccination for child but no circumcision. COVID status neg.  No Baby is a 38+4 wk GA Male born to a 32 y/o  mother via . Maternal history uncomplicated. Prenatal history uncomplicated. Maternal BT A+. PNL neg, NR, and immune. GBS neg on . AROM at 12:10 on 9/10, clear fluids. Baby born vigorous and crying spontaneously. Tight nuchal x2. Delayed cord clamping for 60 seconds. WDSS. Apgars 8/9 for color. EOS 0.10. Mom plans to breastfeed, would like hepB vaccination for child but no circumcision. COVID status neg.     Since admission to the [facility] baby received routine  care and fed, voided and stooled appropriately.  Discharge weight down _____% from birthweight  Discharge bilirubin ______ at _____ HOL which is ______risk zone    Pediatric Attending Addendum:  I have read and agree with above PGY1 Discharge Note except for any changes detailed below.   I have spent > 30 minutes with the patient and the patient's family on direct patient care and discharge planning.  Discharge note will be faxed to appropriate outpatient pediatrician.  Plan to follow-up per above.  Please see above weight and bilirubin.     Due to the nationwide health emergency surrounding COVID-19, and to reduce possible spreading of the virus in the healthcare setting, the parents were offered an early  discharge for their low-risk infant after 24 hrs of life. The baby had all of the appropriate  screens before discharge and was noted to have normal feeding/voiding/stooling patterns at the time of discharge. The parents are aware to follow up with their outpatient pediatrician within 24-48 hrs and to closely monitor infant at home for any worrisome signs including, but not limited to, poor feeding, excess weight loss, dehydration, respiratory distress, fever, or any other concern. Parents agree to contact the baby's healthcare provider for any of the above.      Discharge Exam:  GEN: NAD alert active  HEENT:  AFOF, +RR b/l, MMM  CHEST: nml s1/s2, RRR, no murmur, lungs cta b/l  Abd: soft/nt/nd +bs no hsm  umbilical stump c/d/i  Hips: neg Ortolani/Chaudhari  : slight penoscrotal webbing, mariama 1, testes descended b/l  Neuro: +grasp/suck/tatum  Skin: no abnormal rash    Tiffany Kilinsky DO  Pediatric Hospitalist   Baby is a 38+4 wk GA Male born to a 34 y/o  mother via . Maternal history uncomplicated. Prenatal history uncomplicated. Maternal BT A+. PNL neg, NR, and immune. GBS neg on . AROM at 12:10 on 9/10, clear fluids. Baby born vigorous and crying spontaneously. Tight nuchal x2. Delayed cord clamping for 60 seconds. WDSS. Apgars 8/9 for color. EOS 0.10. Mom plans to breastfeed, would like hepB vaccination for child but no circumcision. COVID status neg.     Since admission to the [facility] baby received routine  care and fed, voided and stooled appropriately.  Discharge weight down 4.5% from birthweight  Discharge bilirubin 5.8 at 24 HOL which is low intermediate risk zone    Pediatric Attending Addendum:  I have read and agree with above PGY1 Discharge Note except for any changes detailed below.   I have spent > 30 minutes with the patient and the patient's family on direct patient care and discharge planning.  Discharge note will be faxed to appropriate outpatient pediatrician.  Plan to follow-up per above.  Please see above weight and bilirubin.     Due to the nationwide health emergency surrounding COVID-19, and to reduce possible spreading of the virus in the healthcare setting, the parents were offered an early  discharge for their low-risk infant after 24 hrs of life. The baby had all of the appropriate  screens before discharge and was noted to have normal feeding/voiding/stooling patterns at the time of discharge. The parents are aware to follow up with their outpatient pediatrician within 24-48 hrs and to closely monitor infant at home for any worrisome signs including, but not limited to, poor feeding, excess weight loss, dehydration, respiratory distress, fever, or any other concern. Parents agree to contact the baby's healthcare provider for any of the above.      Discharge Exam:  GEN: NAD alert active  HEENT:  AFOF, +RR b/l, MMM  CHEST: nml s1/s2, RRR, no murmur, lungs cta b/l  Abd: soft/nt/nd +bs no hsm  umbilical stump c/d/i  Hips: neg Ortolani/Chaudhari  : slight penoscrotal webbing, mariama 1, testes descended b/l  Neuro: +grasp/suck/tatum  Skin: no abnormal rash    Tiffany Brown DO  Pediatric Hospitalist

## 2021-10-22 NOTE — OB PST NOTE - PROBLEM SELECTOR PLAN 1
Pt scheduled for repeat C section. Labs pending.   preop instructions provided to pt.   Chlorhexidine scrubs provided with instructions.   pt advised to contact her ob regarding hydroxychloroquine prior to surgery.   Pt also advised to stop multivitamin  5 days prior to surgery

## 2021-10-22 NOTE — OB PST NOTE - NSHPPHYSICALEXAM_GEN_ALL_CORE
Constitutional: Well Developed, Well Groomed, Well Nourished, No Distress    Eyes: PERRL, EOMI, conjunctiva clear    Ears: Normal    Mouth & Gums: Normal, moist    Pharynx: No tenderness, discharge, or peritonsillar abscess    Tonsils: No Redness, discharge, tenderness, or swelling    Neck: Supple, no JVD, normal thyroid glands, no carotid bruits, no cervical vertebral or paraspinal tenderness    Breast: deferred    Back: Normal shape, ROM intact, strength intact, no vertebral tenderness    Respiratory: Airway patent, breath sounds equal, good air movement, respiration non-labored, clear to auscultation bilateral, no chest wall tenderness, no intercostal retractions, no rales, no wheezes, no rhonchi, no subcutaneous emphysema    Cardiovascular:  Regular rate and rhythm, no rubs or murmur, normal PMI    Gastrointestinal: gravid uterus , good fetal movements as per mom    Extremities: No clubbing, cyanosis, or pedal edema    Vascular:  Carotid Pulse normal , Radial Pulse normal, Femoral Pulse normal, DP pulse normal, PT pulse normal    Neurological: alert & oriented x 3, sensation intact, deep reflexes intact, cranial nerve intact, normal strength    Skin: warm and dry, normal color    Lymph Nodes: normal posterior cervical lymph node, normal anterior cervical lymph node, normal supraclavicular lymph node, normal axillary lymph node, normal inguinal lymph node, normal femoral lymph node    Musculoskeletal: ROM intact, no joint swelling, warmth, or calf tenderness. Normal strength    Psychiatric: normal affect, normal behavior

## 2021-10-22 NOTE — OB PST NOTE - NSICDXPASTMEDICALHX_GEN_ALL_CORE_FT
PAST MEDICAL HISTORY:  Anxiety on no meds    HSV-1 infection     Rheumatoid arthritis On Planquinil for last few months

## 2021-10-22 NOTE — OB PST NOTE - NSHPREVIEWOFSYSTEMS_GEN_ALL_CORE
General: No fever, chills, sweating, anorexia, weight loss or weight gain. No polyphagia, polyurea, polydypsia, malaise, or fatigue    Skin: No rashes, itching, or dryness. No change in size/color of moles. No tumors, brittle nails, pitted nails, or hair loss    Breast: No tenderness, lumps, or nipple discharge      Ophthalmologic: No diplopia, photophobia, lacrimation, blurred Vision , or eye discharge    HEENT Symptoms: No hearing difficulty, ear pain, tinnitus, or vertigo. No sinus symptoms, nasal congestion, nasal   discharge, or nasal obstruction    Respiratory and Thorax: No wheezing, dyspnea, cough, hemoptysis, or pleuritic chest pPain     Cardiovascular: No chest pain, palpitations, dyspnea on exertion, orthopnea, paroxysmal nocturnal dyspnea,   peripheral edema, or claudication    Gastrointestinal: No nausea, vomiting during pregnancy, denies diarrhea, constipation, change in bowel habits, flatulence, abdominal pain, or melena    Genitourinary/ Pelvis: No hematuria, renal colic, or flank pain.  No urine discoloration, incontinence, dysuria, or urinary hesitancy. Normal urinary frequency. No nocturia, abnormal vaginal bleeding, vaginal discharge, spotting, pelvic pain, or vaginal leakage    Musculoskeletal: No arthralgia, arthritis, joint swelling, muscle cramping, muscle weakness, neck pain, arm pain, or leg pain    Neurological: No transient paralysis, weakness, paresthesias, or seizures. No syncope, tremors, vertigo, loss of sensation, difficulty walking, loss of consciousness, hemiparesis, confusion, or facial palsy    Psychiatric: No suicidal ideation, depression, anxiety, insomnia, memory loss, paranoia, mood swings, agitation, hallucinations, or hyperactivity    Hematology: No gum bleeding, nose bleeding, or skin lumps    Lymphatic: No enlarged or tender lymph nodes. No extremity swelling    Endocrine: No heat or cold intolerance    Immunologic: No recurrent or persistent infections

## 2021-10-22 NOTE — OB PST NOTE - NSANTHOSAYNRD_GEN_A_CORE
never tested/No. CONSTANTINE screening performed.  STOP BANG Legend: 0-2 = LOW Risk; 3-4 = INTERMEDIATE Risk; 5-8 = HIGH Risk

## 2021-10-23 ENCOUNTER — OUTPATIENT (OUTPATIENT)
Dept: OUTPATIENT SERVICES | Facility: HOSPITAL | Age: 31
LOS: 1 days | Discharge: HOME | End: 2021-10-23
Payer: COMMERCIAL

## 2021-10-23 VITALS
DIASTOLIC BLOOD PRESSURE: 60 MMHG | SYSTOLIC BLOOD PRESSURE: 122 MMHG | TEMPERATURE: 98 F | HEART RATE: 82 BPM | RESPIRATION RATE: 16 BRPM

## 2021-10-23 VITALS — DIASTOLIC BLOOD PRESSURE: 56 MMHG | SYSTOLIC BLOOD PRESSURE: 114 MMHG | HEART RATE: 83 BPM

## 2021-10-23 DIAGNOSIS — Z98.890 OTHER SPECIFIED POSTPROCEDURAL STATES: Chronic | ICD-10-CM

## 2021-10-23 DIAGNOSIS — Z98.891 HISTORY OF UTERINE SCAR FROM PREVIOUS SURGERY: Chronic | ICD-10-CM

## 2021-10-23 DIAGNOSIS — Z3A.00 WEEKS OF GESTATION OF PREGNANCY NOT SPECIFIED: ICD-10-CM

## 2021-10-23 DIAGNOSIS — O26.899 OTHER SPECIFIED PREGNANCY RELATED CONDITIONS, UNSPECIFIED TRIMESTER: ICD-10-CM

## 2021-10-23 PROCEDURE — 99215 OFFICE O/P EST HI 40 MIN: CPT | Mod: 25

## 2021-10-23 PROCEDURE — 76818 FETAL BIOPHYS PROFILE W/NST: CPT | Mod: 26

## 2021-10-23 RX ORDER — SODIUM CHLORIDE 9 MG/ML
1000 INJECTION, SOLUTION INTRAVENOUS
Refills: 0 | Status: DISCONTINUED | OUTPATIENT
Start: 2021-10-23 | End: 2021-11-06

## 2021-10-23 RX ADMIN — SODIUM CHLORIDE 500 MILLILITER(S): 9 INJECTION, SOLUTION INTRAVENOUS at 12:17

## 2021-10-23 NOTE — OB PROVIDER TRIAGE NOTE - NSHPPHYSICALEXAM_GEN_ALL_CORE
Gen:A &O x 3; NAD  Vitals: BP-122/60; P-82; T-36.9    Pulm-CTA B/L; no wheezes  Cor-clear S1S2; no murmurs  Abd exam-soft and nontender    NST Gen:A &O x 3; NAD  Vitals: BP-122/60; P-82; T-36.9    Pulm-CTA B/L; no wheezes  Cor-clear S1S2; no murmurs  Abd exam-soft and nontender    TAS-vtx; anterior placenta; ZACKARY-6.04 cm with a 4.44 cm x 2.79 cm pocket; 8/8 BPP Gen:A &O x 3; NAD  Vitals: BP-122/60; P-82; T-36.9    Pulm-CTA B/L; no wheezes  Cor-clear S1S2; no murmurs  Abd exam-soft and nontender    TAS-vtx; anterior placenta; ZACKARY-6.04 cm with a 4.44 cm x 2.79 cm pocket; 8/8 BPP  ----------------  NST cat I with 10 beat accels. Pt reports she is NPO as instructed. Pt was dc Dr Ibanez and pt was ordered for D5LR bolus  --------------  1325-NST after D5LR is 140 baseline with accels and mod variability; Pt was reviewed/approved by Dr Shepherd

## 2021-10-23 NOTE — OB PROVIDER TRIAGE NOTE - NSOBPROVIDERNOTE_OBGYN_ALL_OB_FT
32yo  @ 38.2 wks SLIUP here as follow up to ZACKARY check 32yo  @ 38.2 wks SLIUP here as follow up to ZACKARY check  -ZACKARY today is 6.04 with a 4.44 cm x 2.79 cm pocket 30yo  @ 38.2 wks SLIUP here as follow up to ZACKARY check  -ZACKARY today is 6.04 with a 4.44 cm x 2.79 cm pocket  -NST after D5LR is cat I with accels and mod variability; reviewed by Dr Moulton  -pt was dw Dr Ibanez. Pt was dc home with instructions. Pt to keep next scheduled appt with Dr Lopez

## 2021-10-23 NOTE — OB PROVIDER TRIAGE NOTE - HISTORY OF PRESENT ILLNESS
30yo female  @ 38.2 wks SLIUP uncomp PNC here on Thursday for possible delivery due to oligohydramnios of 3.52. Pt was rescanned by Dr Gastelum and found to have an ZACKARY of 6.33 with a cat I NST. Pt was dc home and was instructed to return to [facility] today for repeat NST and BPP. Pt reports GFM and denies LOF/ctx's.    Pmhx-rheumatoid arthritis  PShx/Lzcy-n-tsvtset; R knee meniscus  Meds-PNV; plaquenil  NKDA  Past ob-SAB x 1               [id number]  Gyn-Hx HSV1; outbreak in buttocks 2 yrs ago         - uterine cyst  Soc-anxiety; no meds or therapy

## 2021-10-26 ENCOUNTER — APPOINTMENT (OUTPATIENT)
Dept: OBGYN | Facility: CLINIC | Age: 31
End: 2021-10-26
Payer: COMMERCIAL

## 2021-10-26 ENCOUNTER — APPOINTMENT (OUTPATIENT)
Dept: ANTEPARTUM | Facility: CLINIC | Age: 31
End: 2021-10-26
Payer: COMMERCIAL

## 2021-10-26 VITALS — WEIGHT: 194 LBS | BODY MASS INDEX: 35.48 KG/M2 | DIASTOLIC BLOOD PRESSURE: 66 MMHG | SYSTOLIC BLOOD PRESSURE: 109 MMHG

## 2021-10-26 PROBLEM — F41.9 ANXIETY DISORDER, UNSPECIFIED: Chronic | Status: ACTIVE | Noted: 2021-10-22

## 2021-10-26 PROCEDURE — 0502F SUBSEQUENT PRENATAL CARE: CPT

## 2021-10-26 PROCEDURE — ZZZZZ: CPT

## 2021-10-26 PROCEDURE — 76818 FETAL BIOPHYS PROFILE W/NST: CPT

## 2021-10-28 ENCOUNTER — TRANSCRIPTION ENCOUNTER (OUTPATIENT)
Age: 31
End: 2021-10-28

## 2021-10-28 LAB — SARS-COV-2 N GENE NPH QL NAA+PROBE: NOT DETECTED

## 2021-10-29 ENCOUNTER — TRANSCRIPTION ENCOUNTER (OUTPATIENT)
Age: 31
End: 2021-10-29

## 2021-10-29 ENCOUNTER — INPATIENT (INPATIENT)
Facility: HOSPITAL | Age: 31
LOS: 1 days | Discharge: HOME | End: 2021-10-31
Attending: OBSTETRICS & GYNECOLOGY | Admitting: OBSTETRICS & GYNECOLOGY
Payer: COMMERCIAL

## 2021-10-29 VITALS
TEMPERATURE: 98 F | HEIGHT: 62 IN | HEART RATE: 82 BPM | WEIGHT: 293 LBS | SYSTOLIC BLOOD PRESSURE: 122 MMHG | RESPIRATION RATE: 17 BRPM | DIASTOLIC BLOOD PRESSURE: 75 MMHG

## 2021-10-29 DIAGNOSIS — Z98.890 OTHER SPECIFIED POSTPROCEDURAL STATES: Chronic | ICD-10-CM

## 2021-10-29 DIAGNOSIS — O34.211 MATERNAL CARE FOR LOW TRANSVERSE SCAR FROM PREVIOUS CESAREAN DELIVERY: ICD-10-CM

## 2021-10-29 DIAGNOSIS — Z98.891 HISTORY OF UTERINE SCAR FROM PREVIOUS SURGERY: ICD-10-CM

## 2021-10-29 DIAGNOSIS — Z98.891 HISTORY OF UTERINE SCAR FROM PREVIOUS SURGERY: Chronic | ICD-10-CM

## 2021-10-29 LAB
BASOPHILS # BLD AUTO: 0.05 K/UL — SIGNIFICANT CHANGE UP (ref 0–0.2)
BASOPHILS NFR BLD AUTO: 0.5 % — SIGNIFICANT CHANGE UP (ref 0–2)
BLD GP AB SCN SERPL QL: NEGATIVE — SIGNIFICANT CHANGE UP
COVID-19 SPIKE DOMAIN AB INTERP: NEGATIVE — SIGNIFICANT CHANGE UP
COVID-19 SPIKE DOMAIN ANTIBODY RESULT: 0.4 U/ML — SIGNIFICANT CHANGE UP
EOSINOPHIL # BLD AUTO: 0.05 K/UL — SIGNIFICANT CHANGE UP (ref 0–0.5)
EOSINOPHIL NFR BLD AUTO: 0.5 % — SIGNIFICANT CHANGE UP (ref 0–6)
HBV SURFACE AG SERPL QL IA: SIGNIFICANT CHANGE UP
HCT VFR BLD CALC: 33.6 % — LOW (ref 34.5–45)
HCT VFR BLD CALC: 37.8 % — SIGNIFICANT CHANGE UP (ref 34.5–45)
HGB BLD-MCNC: 11.6 G/DL — SIGNIFICANT CHANGE UP (ref 11.5–15.5)
HGB BLD-MCNC: 13.1 G/DL — SIGNIFICANT CHANGE UP (ref 11.5–15.5)
IANC: 7.52 K/UL — SIGNIFICANT CHANGE UP (ref 1.5–8.5)
IMM GRANULOCYTES NFR BLD AUTO: 1.1 % — SIGNIFICANT CHANGE UP (ref 0–1.5)
LYMPHOCYTES # BLD AUTO: 19.8 % — SIGNIFICANT CHANGE UP (ref 13–44)
LYMPHOCYTES # BLD AUTO: 2.16 K/UL — SIGNIFICANT CHANGE UP (ref 1–3.3)
MCHC RBC-ENTMCNC: 29.6 PG — SIGNIFICANT CHANGE UP (ref 27–34)
MCHC RBC-ENTMCNC: 29.8 PG — SIGNIFICANT CHANGE UP (ref 27–34)
MCHC RBC-ENTMCNC: 34.5 GM/DL — SIGNIFICANT CHANGE UP (ref 32–36)
MCHC RBC-ENTMCNC: 34.7 GM/DL — SIGNIFICANT CHANGE UP (ref 32–36)
MCV RBC AUTO: 85.3 FL — SIGNIFICANT CHANGE UP (ref 80–100)
MCV RBC AUTO: 86.4 FL — SIGNIFICANT CHANGE UP (ref 80–100)
MONOCYTES # BLD AUTO: 1.02 K/UL — HIGH (ref 0–0.9)
MONOCYTES NFR BLD AUTO: 9.3 % — SIGNIFICANT CHANGE UP (ref 2–14)
NEUTROPHILS # BLD AUTO: 7.52 K/UL — HIGH (ref 1.8–7.4)
NEUTROPHILS NFR BLD AUTO: 68.8 % — SIGNIFICANT CHANGE UP (ref 43–77)
NRBC # BLD: 0 /100 WBCS — SIGNIFICANT CHANGE UP
NRBC # BLD: 0 /100 WBCS — SIGNIFICANT CHANGE UP
NRBC # FLD: 0 K/UL — SIGNIFICANT CHANGE UP
NRBC # FLD: 0 K/UL — SIGNIFICANT CHANGE UP
PLATELET # BLD AUTO: 235 K/UL — SIGNIFICANT CHANGE UP (ref 150–400)
PLATELET # BLD AUTO: 246 K/UL — SIGNIFICANT CHANGE UP (ref 150–400)
RBC # BLD: 3.89 M/UL — SIGNIFICANT CHANGE UP (ref 3.8–5.2)
RBC # BLD: 4.43 M/UL — SIGNIFICANT CHANGE UP (ref 3.8–5.2)
RBC # FLD: 12.8 % — SIGNIFICANT CHANGE UP (ref 10.3–14.5)
RBC # FLD: 12.8 % — SIGNIFICANT CHANGE UP (ref 10.3–14.5)
RH IG SCN BLD-IMP: POSITIVE — SIGNIFICANT CHANGE UP
RUBV IGG SER-ACNC: 17 INDEX — SIGNIFICANT CHANGE UP
RUBV IGG SER-IMP: POSITIVE — SIGNIFICANT CHANGE UP
SARS-COV-2 IGG+IGM SERPL QL IA: 0.4 U/ML — SIGNIFICANT CHANGE UP
SARS-COV-2 IGG+IGM SERPL QL IA: NEGATIVE — SIGNIFICANT CHANGE UP
T PALLIDUM AB TITR SER: NEGATIVE — SIGNIFICANT CHANGE UP
WBC # BLD: 10.92 K/UL — HIGH (ref 3.8–10.5)
WBC # BLD: 16.38 K/UL — HIGH (ref 3.8–10.5)
WBC # FLD AUTO: 10.92 K/UL — HIGH (ref 3.8–10.5)
WBC # FLD AUTO: 16.38 K/UL — HIGH (ref 3.8–10.5)

## 2021-10-29 PROCEDURE — 59510 CESAREAN DELIVERY: CPT | Mod: U9

## 2021-10-29 PROCEDURE — 59514 CESAREAN DELIVERY ONLY: CPT | Mod: AS,U9

## 2021-10-29 PROCEDURE — 59025 FETAL NON-STRESS TEST: CPT | Mod: 26

## 2021-10-29 RX ORDER — MAGNESIUM HYDROXIDE 400 MG/1
30 TABLET, CHEWABLE ORAL
Refills: 0 | Status: DISCONTINUED | OUTPATIENT
Start: 2021-10-29 | End: 2021-10-31

## 2021-10-29 RX ORDER — OXYTOCIN 10 UNIT/ML
333.33 VIAL (ML) INJECTION
Qty: 20 | Refills: 0 | Status: DISCONTINUED | OUTPATIENT
Start: 2021-10-29 | End: 2021-10-31

## 2021-10-29 RX ORDER — SIMETHICONE 80 MG/1
80 TABLET, CHEWABLE ORAL EVERY 4 HOURS
Refills: 0 | Status: DISCONTINUED | OUTPATIENT
Start: 2021-10-29 | End: 2021-10-31

## 2021-10-29 RX ORDER — CITRIC ACID/SODIUM CITRATE 300-500 MG
30 SOLUTION, ORAL ORAL ONCE
Refills: 0 | Status: COMPLETED | OUTPATIENT
Start: 2021-10-29 | End: 2021-10-29

## 2021-10-29 RX ORDER — IBUPROFEN 200 MG
600 TABLET ORAL EVERY 6 HOURS
Refills: 0 | Status: COMPLETED | OUTPATIENT
Start: 2021-10-29 | End: 2022-09-27

## 2021-10-29 RX ORDER — SODIUM CHLORIDE 9 MG/ML
1000 INJECTION, SOLUTION INTRAVENOUS
Refills: 0 | Status: DISCONTINUED | OUTPATIENT
Start: 2021-10-29 | End: 2021-10-31

## 2021-10-29 RX ORDER — DIPHENHYDRAMINE HCL 50 MG
25 CAPSULE ORAL EVERY 6 HOURS
Refills: 0 | Status: DISCONTINUED | OUTPATIENT
Start: 2021-10-29 | End: 2021-10-31

## 2021-10-29 RX ORDER — SODIUM CHLORIDE 9 MG/ML
1000 INJECTION, SOLUTION INTRAVENOUS ONCE
Refills: 0 | Status: DISCONTINUED | OUTPATIENT
Start: 2021-10-29 | End: 2021-10-29

## 2021-10-29 RX ORDER — SODIUM CHLORIDE 9 MG/ML
1000 INJECTION, SOLUTION INTRAVENOUS ONCE
Refills: 0 | Status: COMPLETED | OUTPATIENT
Start: 2021-10-29 | End: 2021-10-29

## 2021-10-29 RX ORDER — ACETAMINOPHEN 500 MG
975 TABLET ORAL
Refills: 0 | Status: DISCONTINUED | OUTPATIENT
Start: 2021-10-29 | End: 2021-10-31

## 2021-10-29 RX ORDER — INFLUENZA VIRUS VACCINE 15; 15; 15; 15 UG/.5ML; UG/.5ML; UG/.5ML; UG/.5ML
0.5 SUSPENSION INTRAMUSCULAR ONCE
Refills: 0 | Status: COMPLETED | OUTPATIENT
Start: 2021-10-29 | End: 2021-10-29

## 2021-10-29 RX ORDER — LANOLIN
1 OINTMENT (GRAM) TOPICAL EVERY 6 HOURS
Refills: 0 | Status: DISCONTINUED | OUTPATIENT
Start: 2021-10-29 | End: 2021-10-31

## 2021-10-29 RX ORDER — OXYCODONE HYDROCHLORIDE 5 MG/1
5 TABLET ORAL
Refills: 0 | Status: DISCONTINUED | OUTPATIENT
Start: 2021-10-29 | End: 2021-10-31

## 2021-10-29 RX ORDER — TETANUS TOXOID, REDUCED DIPHTHERIA TOXOID AND ACELLULAR PERTUSSIS VACCINE, ADSORBED 5; 2.5; 8; 8; 2.5 [IU]/.5ML; [IU]/.5ML; UG/.5ML; UG/.5ML; UG/.5ML
0.5 SUSPENSION INTRAMUSCULAR ONCE
Refills: 0 | Status: DISCONTINUED | OUTPATIENT
Start: 2021-10-29 | End: 2021-10-31

## 2021-10-29 RX ORDER — HEPARIN SODIUM 5000 [USP'U]/ML
5000 INJECTION INTRAVENOUS; SUBCUTANEOUS EVERY 12 HOURS
Refills: 0 | Status: DISCONTINUED | OUTPATIENT
Start: 2021-10-29 | End: 2021-10-31

## 2021-10-29 RX ORDER — ACETAMINOPHEN 500 MG
1000 TABLET ORAL ONCE
Refills: 0 | Status: COMPLETED | OUTPATIENT
Start: 2021-10-29 | End: 2021-10-29

## 2021-10-29 RX ORDER — SODIUM CHLORIDE 9 MG/ML
1000 INJECTION, SOLUTION INTRAVENOUS
Refills: 0 | Status: DISCONTINUED | OUTPATIENT
Start: 2021-10-29 | End: 2021-10-29

## 2021-10-29 RX ORDER — FAMOTIDINE 10 MG/ML
20 INJECTION INTRAVENOUS ONCE
Refills: 0 | Status: COMPLETED | OUTPATIENT
Start: 2021-10-29 | End: 2021-10-29

## 2021-10-29 RX ORDER — KETOROLAC TROMETHAMINE 30 MG/ML
30 SYRINGE (ML) INJECTION EVERY 6 HOURS
Refills: 0 | Status: DISCONTINUED | OUTPATIENT
Start: 2021-10-29 | End: 2021-10-30

## 2021-10-29 RX ORDER — OXYTOCIN 10 UNIT/ML
333.33 VIAL (ML) INJECTION
Qty: 20 | Refills: 0 | Status: DISCONTINUED | OUTPATIENT
Start: 2021-10-29 | End: 2021-10-29

## 2021-10-29 RX ORDER — HYDROXYCHLOROQUINE SULFATE 200 MG
200 TABLET ORAL
Refills: 0 | Status: DISCONTINUED | OUTPATIENT
Start: 2021-10-29 | End: 2021-10-31

## 2021-10-29 RX ORDER — OXYCODONE HYDROCHLORIDE 5 MG/1
5 TABLET ORAL ONCE
Refills: 0 | Status: DISCONTINUED | OUTPATIENT
Start: 2021-10-29 | End: 2021-10-31

## 2021-10-29 RX ORDER — CITRIC ACID/SODIUM CITRATE 300-500 MG
30 SOLUTION, ORAL ORAL ONCE
Refills: 0 | Status: DISCONTINUED | OUTPATIENT
Start: 2021-10-29 | End: 2021-10-29

## 2021-10-29 RX ADMIN — Medication 200 MILLIGRAM(S): at 20:13

## 2021-10-29 RX ADMIN — Medication 30 MILLIGRAM(S): at 18:57

## 2021-10-29 RX ADMIN — HEPARIN SODIUM 5000 UNIT(S): 5000 INJECTION INTRAVENOUS; SUBCUTANEOUS at 18:57

## 2021-10-29 RX ADMIN — Medication 1000 MILLIGRAM(S): at 12:15

## 2021-10-29 RX ADMIN — SODIUM CHLORIDE 75 MILLILITER(S): 9 INJECTION, SOLUTION INTRAVENOUS at 09:16

## 2021-10-29 RX ADMIN — Medication 400 MILLIGRAM(S): at 10:15

## 2021-10-29 RX ADMIN — Medication 975 MILLIGRAM(S): at 19:57

## 2021-10-29 RX ADMIN — Medication 30 MILLILITER(S): at 06:57

## 2021-10-29 RX ADMIN — Medication 975 MILLIGRAM(S): at 18:30

## 2021-10-29 RX ADMIN — Medication 30 MILLIGRAM(S): at 19:57

## 2021-10-29 RX ADMIN — FAMOTIDINE 20 MILLIGRAM(S): 10 INJECTION INTRAVENOUS at 06:57

## 2021-10-29 RX ADMIN — Medication 1000 MILLIUNIT(S)/MIN: at 09:16

## 2021-10-29 RX ADMIN — SODIUM CHLORIDE 2000 MILLILITER(S): 9 INJECTION, SOLUTION INTRAVENOUS at 06:41

## 2021-10-29 RX ADMIN — SODIUM CHLORIDE 125 MILLILITER(S): 9 INJECTION, SOLUTION INTRAVENOUS at 06:58

## 2021-10-29 NOTE — OB PROVIDER H&P - ASSESSMENT
32 yo , EGA@39.2 weeks, presented for scheduled repeat  section.  Patient denies contractions, denies vaginal bleeding, leakage of fluid, and reports fetal movement.  Denies fever, chills, headaches, changes in vision, chest pain, palpitations, shortness of breath, cough, nausea, vomiting, diarrhea, constipation, urinary symptoms, edema.    Prenatal care with Dr Lopez.   Prenatal course is uncomplicated.    Patient reports hx of HSV1, last lesions (lips) 1 year ago, was prescribed Valtrex for prophylaxis, patient decided to start postpartum.   Patient appears to have no apparent lesions, denies tingling.    GBS status is negative.  Patient denies signs and symptoms of COVID 19; denies symptomatic illness; not vaccinated.    No adverse reactions to anesthesia, no objections to blood transfusions if clinically indicated.  OB hx: 2009,  section, gest HTN (?), scheduled, [id number]            SAB, 5 weeks, , complete   Med hx: rheumatoid arthritis, was on Plaquenil stopped 3 days ago  Surg hx: meniscus repair, 2019               section,  GYN hx: denies hx of abnormal pap smear /cysts/fibroids/STDs  Meds: Prenatal vitamins  Allergies    No Known Allergies    Intolerances      Social hx: Denies alcohol, tobacco, drug use  Psych hx: denies hx of anxiety/depression; lives with    PHYSICAL EXAM  Vital Signs Last 24 Hrs  T(C): 36.8 (29 Oct 2021 06:13), Max: 36.9 (29 Oct 2021 05:50)  T(F): 98.24 (29 Oct 2021 06:13), Max: 98.4 (29 Oct 2021 05:50)  HR: 82 (29 Oct 2021 06:10) (82 - 82)  BP: 122/75 (29 Oct 2021 06:10) (122/75 - 122/75)  BP(mean): --  RR: 17 (29 Oct 2021 05:50) (17 - 17)  SpO2: --    Gen: NAD; no HSV lesions visualized   Head: NC/AT  Cardio: S1S2+, RRR  Resp: CTABL, no wheezing  Abdomen: Soft, NT/ND, BS+  Extremities: No LE edema bilaterally    NST category 1, occasional mild contractions    Patient was admitted for scheduled repeat  section.       Teetee Johnson CNM     10-29-21 @ 06:46

## 2021-10-29 NOTE — PROVIDER CONTACT NOTE (OTHER) - ACTION/TREATMENT ORDERED:
stat cbc, remain in PACU for cbc results, steri strips dressing and tape changed. Dressing dry and intact at this time

## 2021-10-29 NOTE — BRIEF OPERATIVE NOTE - OPERATION/FINDINGS
Viable male infant, apgar /, weight 3640 gms  delivered @08:01  cephalic presentation, clear copious fluid noted  hysterotomy closed in single layer   rectus layer reapproximated, fascia layer reapproximated   dense adhesions omentum to abdominal wall  otherwise grossly normal uterus, unable to visualize adnexa due to dense adhesions     QBL: 707 cc  UOP: 100 cc  IVF: 2800 cc  Dictation Number: [id number]

## 2021-10-29 NOTE — DISCHARGE NOTE OB - MEDICATION SUMMARY - MEDICATIONS TO TAKE
I will START or STAY ON the medications listed below when I get home from the hospital:    acetaminophen 325 mg oral tablet  -- 3 tab(s) by mouth   -- Indication: For  section    ibuprofen 600 mg oral tablet  -- 1 tab(s) by mouth every 6 hours  -- Indication: For  section    Plaquenil 200 mg oral tablet  -- orally 2 times a day  -- Indication: For RA    PNV Prenatal  -- 1  by mouth once a day  -- Indication: For  section

## 2021-10-29 NOTE — BRIEF OPERATIVE NOTE - NSICDXBRIEFPOSTOP_GEN_ALL_CORE_FT
POST-OP DIAGNOSIS:  Delivery by  section using transverse incision of lower segment of uterus 29-Oct-2021 09:13:46  Teetee Johnson

## 2021-10-29 NOTE — PROVIDER CONTACT NOTE (OTHER) - ASSESSMENT
LAKHWINDER Johnson and Dr Lopez at bedside. LAKHWINDER Johnson removed tape from dressing, dressing noted to be fully saturated with dark red blood.

## 2021-10-29 NOTE — OB RN PATIENT PROFILE - NS_VBACATTEMPT_OBGYN_ALL_OB
CONSTITUTIONAL: in mild acute distress  TRAUMA: ABC intact, GCS 15  HEAD: normocephalic, atraumatic  EYES: PERRL at 3 mm, EOMI, no raccoon eyes  ENT: no nasal discharge, no mandibular malalignment  NECK: no midline tenderness, no stepoffs, no deformity  CV: regular rate and rhythm, equal distal pulses  RESP: lungs clear to auscultation bilaterally, normal work of breathing, symmetric rise and fall of chest   CHEST: no chest wall tenderness, no crepitus, no clavicular deformity/tenting  ABD: soft, nondistended, nontender, no rebound, no guarding, no rigidity  BACK: no midline T/L/S-spine tenderness, no stepoffs, no deformity  EXT: cap refill < 2 seconds; + TTP of L hip, shortened and externally rotated, DP pulses 2+, sensation in tact, moving all extremities, unable to range L hip  SKIN: no rashes, no lacerations, no abrasions  NEURO: A&Ox3,grossly intact throughout, no focal neurological deficits, GCS 15  PSYCH: appropriate affect
N/A

## 2021-10-29 NOTE — DISCHARGE NOTE OB - CARE PROVIDER_API CALL
West Lopez)  MaternalFetal Medicine; Obstetrics and Gynecology  [address] [place] [address]  Phone: [phone]  Fax: [phone]  Follow Up Time:

## 2021-10-29 NOTE — OB PROVIDER DELIVERY SUMMARY - NSPROVIDERDELIVERYNOTE_OBGYN_ALL_OB_FT
Viable male infant, apgar /, weight 3640 gms  delivered @08:01  cephalic presentation, clear copious fluid noted  hysterotomy closed in single layer   rectus layer reapproximated, fascia layer reapproximated   dense adhesions omentum to abdominal wall  otherwise grossly normal uterus, unable to visualize adnexa due to dense adhesions     QBL: 707 cc  UOP: 100 cc  IVF: 2800 cc  Dictation Number: [id number]

## 2021-10-29 NOTE — OB RN DELIVERY SUMMARY - NSSELHIDDEN_OBGYN_ALL_OB_FT
[NS_DeliveryAttending1_OBGYN_ALL_OB_FT:[id number]],[NS_DeliveryRN_OBGYN_ALL_OB_FT:[id number]]

## 2021-10-29 NOTE — DISCHARGE NOTE OB - CARE PLAN
Assessment and plan of treatment:	normal diet and activity   Principal Discharge DX:	 delivery delivered  Assessment and plan of treatment:	normal diet and activity   1

## 2021-10-29 NOTE — DISCHARGE NOTE OB - PATIENT PORTAL LINK FT
You can access the FollowMyHealth Patient Portal offered by [facility] by registering at the following website: [email or web address]. By joining [employer]s FollowMyHealth portal, you will also be able to view your health information using other applications (apps) compatible with our system.

## 2021-10-29 NOTE — OB RN INTRAOPERATIVE NOTE - NSSELHIDDEN_OBGYN_ALL_OB_FT
[NS_DeliveryAttending1_OBGYN_ALL_OB_FT:[id number]],[NS_DeliveryRN_OBGYN_ALL_OB_FT:[id number]=]

## 2021-10-29 NOTE — DISCHARGE NOTE OB - MATERIALS PROVIDED
[facility] [place] Screening [facility]/[place]  Immunization Record/Guide to Postpartum Care/Shaken Baby Prevention Handout/Discharge Medication Information for Patients and Families Pocket Guide

## 2021-10-29 NOTE — OB RN PATIENT PROFILE - CONTRAINDICATIONS & PRECAUTIONS (SELECT ALL THAT APPLY)
Final Anesthesia Post-op Assessment    Patient: Kailash Breaux  Procedure(s) Performed: STRABISMUS SURGERY/BIMEDIAL RECTUS RECESSION ([facility]) - BILATERAL  Anesthesia type: General    Vitals Value Taken Time   Temp 36.8 3/11/2020  8:20 AM   Pulse 118 3/11/2020  8:40 AM   Resp 22 3/11/2020  8:40 AM   SpO2 95 % 3/11/2020  8:40 AM   /64 3/11/2020  8:40 AM       Last 24 I/O:     Intake/Output Summary (Last 24 hours) at 3/11/2020 0930  Last data filed at 3/11/2020 0851  Gross per 24 hour   Intake 350 ml   Output --   Net 350 ml         Patient Location: Phase II  Post-op Vital Signs:stable  Level of Consciousness: participates in exam, answers questions appropriately, awake and oriented  Respiratory Status: spontaneous ventilation  Cardiovascular blood pressure returned to baseline and stable  Hydration: euvolemic  Pain Management: adequately controlled  Nausea: None  Airway Patency:patent  Post-op Assessment: awake, alert, appropriately conversant, or baseline, no complications and patient tolerated procedure well with no complications      
none...

## 2021-10-29 NOTE — CHART NOTE - NSCHARTNOTEFT_GEN_A_CORE
I presented to the bedside to assess pt following report from RN pt had a saturated pressure dressing following c/s. Teetee (NP) and Dr. Lopez at the bed side performing dressing change. Per RN initial pressure dressing was completely soaked with dark red blood. Steri strips and dressing removed and second pressure dressing placed. Patient is currently asymptomatic and all vital signs are stable as below. Per Dr. Lopez, stat CBC to be drawn now.     Vital Signs Last 24 Hrs  T(C): 36.7 (29 Oct 2021 08:55), Max: 36.9 (29 Oct 2021 05:50)  T(F): 98.1 (29 Oct 2021 08:55), Max: 98.4 (29 Oct 2021 05:50)  HR: 73 (29 Oct 2021 10:30) (73 - 84)  BP: 106/54 (29 Oct 2021 10:30) (96/49 - 122/75)  BP(mean): 65 (29 Oct 2021 10:30) (59 - 72)  RR: 21 (29 Oct 2021 10:30) (14 - 21)  SpO2: 100% (29 Oct 2021 10:30) (100% - 100%)     Kirsten Zacarias MD  OBGYN PGY3

## 2021-10-29 NOTE — OB PROVIDER DELIVERY SUMMARY - NSSELHIDDEN_OBGYN_ALL_OB_FT
[NS_DeliveryAttending1_OBGYN_ALL_OB_FT:[id number]],[NS_DeliveryRN_OBGYN_ALL_OB_FT:[id number]],[NS_DeliveryAssist1_OBGYN_ALL_OB_FT:[id number]]

## 2021-10-29 NOTE — PROVIDER CONTACT NOTE (OTHER) - SITUATION
Patient 2 hours s/p c/s w/o complications. VSS. Patient denies dizziness/ pain at this time. Dark red blood noted to be dripping through tape/dressing.

## 2021-10-30 LAB
BASOPHILS # BLD AUTO: 0.04 K/UL — SIGNIFICANT CHANGE UP (ref 0–0.2)
BASOPHILS NFR BLD AUTO: 0.3 % — SIGNIFICANT CHANGE UP (ref 0–2)
EOSINOPHIL # BLD AUTO: 0.04 K/UL — SIGNIFICANT CHANGE UP (ref 0–0.5)
EOSINOPHIL NFR BLD AUTO: 0.3 % — SIGNIFICANT CHANGE UP (ref 0–6)
HCT VFR BLD CALC: 27.7 % — LOW (ref 34.5–45)
HGB BLD-MCNC: 9.5 G/DL — LOW (ref 11.5–15.5)
IANC: 7.95 K/UL — SIGNIFICANT CHANGE UP (ref 1.5–8.5)
IMM GRANULOCYTES NFR BLD AUTO: 0.7 % — SIGNIFICANT CHANGE UP (ref 0–1.5)
LYMPHOCYTES # BLD AUTO: 2.49 K/UL — SIGNIFICANT CHANGE UP (ref 1–3.3)
LYMPHOCYTES # BLD AUTO: 21.3 % — SIGNIFICANT CHANGE UP (ref 13–44)
MCHC RBC-ENTMCNC: 29.5 PG — SIGNIFICANT CHANGE UP (ref 27–34)
MCHC RBC-ENTMCNC: 34.3 GM/DL — SIGNIFICANT CHANGE UP (ref 32–36)
MCV RBC AUTO: 86 FL — SIGNIFICANT CHANGE UP (ref 80–100)
MONOCYTES # BLD AUTO: 1.11 K/UL — HIGH (ref 0–0.9)
MONOCYTES NFR BLD AUTO: 9.5 % — SIGNIFICANT CHANGE UP (ref 2–14)
NEUTROPHILS # BLD AUTO: 7.95 K/UL — HIGH (ref 1.8–7.4)
NEUTROPHILS NFR BLD AUTO: 67.9 % — SIGNIFICANT CHANGE UP (ref 43–77)
NRBC # BLD: 0 /100 WBCS — SIGNIFICANT CHANGE UP
NRBC # FLD: 0 K/UL — SIGNIFICANT CHANGE UP
PLATELET # BLD AUTO: 206 K/UL — SIGNIFICANT CHANGE UP (ref 150–400)
RBC # BLD: 3.22 M/UL — LOW (ref 3.8–5.2)
RBC # FLD: 12.8 % — SIGNIFICANT CHANGE UP (ref 10.3–14.5)
WBC # BLD: 11.71 K/UL — HIGH (ref 3.8–10.5)
WBC # FLD AUTO: 11.71 K/UL — HIGH (ref 3.8–10.5)

## 2021-10-30 RX ORDER — IBUPROFEN 200 MG
600 TABLET ORAL EVERY 6 HOURS
Refills: 0 | Status: DISCONTINUED | OUTPATIENT
Start: 2021-10-30 | End: 2021-10-31

## 2021-10-30 RX ADMIN — Medication 30 MILLIGRAM(S): at 00:32

## 2021-10-30 RX ADMIN — Medication 975 MILLIGRAM(S): at 06:40

## 2021-10-30 RX ADMIN — Medication 600 MILLIGRAM(S): at 23:50

## 2021-10-30 RX ADMIN — Medication 200 MILLIGRAM(S): at 23:18

## 2021-10-30 RX ADMIN — Medication 30 MILLIGRAM(S): at 01:15

## 2021-10-30 RX ADMIN — Medication 975 MILLIGRAM(S): at 01:15

## 2021-10-30 RX ADMIN — Medication 975 MILLIGRAM(S): at 20:14

## 2021-10-30 RX ADMIN — Medication 200 MILLIGRAM(S): at 11:34

## 2021-10-30 RX ADMIN — Medication 600 MILLIGRAM(S): at 23:18

## 2021-10-30 RX ADMIN — Medication 600 MILLIGRAM(S): at 11:35

## 2021-10-30 RX ADMIN — Medication 600 MILLIGRAM(S): at 18:09

## 2021-10-30 RX ADMIN — Medication 30 MILLIGRAM(S): at 06:40

## 2021-10-30 RX ADMIN — Medication 975 MILLIGRAM(S): at 21:00

## 2021-10-30 RX ADMIN — Medication 600 MILLIGRAM(S): at 12:30

## 2021-10-30 RX ADMIN — Medication 600 MILLIGRAM(S): at 17:25

## 2021-10-30 RX ADMIN — Medication 975 MILLIGRAM(S): at 14:41

## 2021-10-30 RX ADMIN — Medication 975 MILLIGRAM(S): at 15:30

## 2021-10-30 RX ADMIN — Medication 975 MILLIGRAM(S): at 00:32

## 2021-10-30 RX ADMIN — HEPARIN SODIUM 5000 UNIT(S): 5000 INJECTION INTRAVENOUS; SUBCUTANEOUS at 17:25

## 2021-10-30 RX ADMIN — HEPARIN SODIUM 5000 UNIT(S): 5000 INJECTION INTRAVENOUS; SUBCUTANEOUS at 06:40

## 2021-10-30 NOTE — LACTATION INITIAL EVALUATION - PRO FEEDING PLAN INFANT OB
Called patient; identity confirmed.     Informed patient of negative Gc/Ct/Trich. Patient has no questions.     Estefanía Hargrove,  PGYII  03/21/19    
initiation of breastfeeding/breast milk feeding

## 2021-10-30 NOTE — PROGRESS NOTE ADULT - ATTENDING COMMENTS
OB Attending    Patient seen and examined at bedside. Agree with above resident assessment and plane  -incision c/d/i  -continue current Postoperative  care    N Josie-MD Osmin

## 2021-10-30 NOTE — PROGRESS NOTE ADULT - ASSESSMENT
A/P: 32yo  h/o rheumatoid arthritis POD#1 s/p LTCS.  Patient is stable and doing well post-operatively.      - RA: c/w home Plaquenil dose  - Continue regular diet.  - Increase ambulation, SCDs when not ambulating, Heparin for DVT ppx  - Continue motrin, tylenol, oxycodone PRN for pain control  - CBC appropriate downtrend 33.6->27.7    M. Elba PGY1

## 2021-10-30 NOTE — LACTATION INITIAL EVALUATION - LACTATION INTERVENTIONS
initiate/review safe skin-to-skin/initiate/review hand expression/initiate/review techniques for position and latch/review techniques to increase milk supply/review techniques to manage sore nipples/engorgement/initiate/review breast massage/compression/reviewed components of an effective feeding and at least 8 effective feedings per day required/reviewed importance of monitoring infant diapers, the breastfeeding log, and minimum output each day/reviewed risks of unnecessary formula supplementation/reviewed strategies to transition to breastfeeding only/reviewed benefits and recommendations for rooming in/reviewed feeding on demand/by cue at least 8 times a day/reviewed indications of inadequate milk transfer that would require supplementation educated   and  reviewed Plaquenil  from  shasha  and  to discuss with  her pediatrician  further.  mother states  she  has   reviewed  medication  with  pediatrician./initiate/review safe skin-to-skin/initiate/review hand expression/initiate/review techniques for position and latch/review techniques to increase milk supply/review techniques to manage sore nipples/engorgement/initiate/review breast massage/compression/reviewed components of an effective feeding and at least 8 effective feedings per day required/reviewed importance of monitoring infant diapers, the breastfeeding log, and minimum output each day/reviewed risks of unnecessary formula supplementation/reviewed strategies to transition to breastfeeding only/reviewed benefits and recommendations for rooming in/reviewed feeding on demand/by cue at least 8 times a day/reviewed indications of inadequate milk transfer that would require supplementation

## 2021-10-31 VITALS
OXYGEN SATURATION: 99 % | DIASTOLIC BLOOD PRESSURE: 68 MMHG | SYSTOLIC BLOOD PRESSURE: 112 MMHG | TEMPERATURE: 99 F | RESPIRATION RATE: 17 BRPM | HEART RATE: 82 BPM

## 2021-10-31 RX ORDER — ERGOCALCIFEROL 1.25 MG/1
2 CAPSULE ORAL
Qty: 0 | Refills: 0 | DISCHARGE

## 2021-10-31 RX ORDER — ACETAMINOPHEN 500 MG
3 TABLET ORAL
Qty: 0 | Refills: 0 | DISCHARGE
Start: 2021-10-31

## 2021-10-31 RX ORDER — IBUPROFEN 200 MG
1 TABLET ORAL
Qty: 0 | Refills: 0 | DISCHARGE
Start: 2021-10-31

## 2021-10-31 RX ADMIN — HEPARIN SODIUM 5000 UNIT(S): 5000 INJECTION INTRAVENOUS; SUBCUTANEOUS at 05:19

## 2021-10-31 RX ADMIN — Medication 600 MILLIGRAM(S): at 12:00

## 2021-10-31 RX ADMIN — Medication 975 MILLIGRAM(S): at 16:30

## 2021-10-31 RX ADMIN — SIMETHICONE 80 MILLIGRAM(S): 80 TABLET, CHEWABLE ORAL at 06:27

## 2021-10-31 RX ADMIN — Medication 600 MILLIGRAM(S): at 17:50

## 2021-10-31 RX ADMIN — Medication 975 MILLIGRAM(S): at 02:20

## 2021-10-31 RX ADMIN — SIMETHICONE 80 MILLIGRAM(S): 80 TABLET, CHEWABLE ORAL at 02:27

## 2021-10-31 RX ADMIN — Medication 600 MILLIGRAM(S): at 18:34

## 2021-10-31 RX ADMIN — Medication 975 MILLIGRAM(S): at 10:30

## 2021-10-31 RX ADMIN — Medication 600 MILLIGRAM(S): at 11:10

## 2021-10-31 RX ADMIN — MAGNESIUM HYDROXIDE 30 MILLILITER(S): 400 TABLET, CHEWABLE ORAL at 11:11

## 2021-10-31 RX ADMIN — Medication 975 MILLIGRAM(S): at 02:50

## 2021-10-31 RX ADMIN — Medication 200 MILLIGRAM(S): at 09:39

## 2021-10-31 RX ADMIN — Medication 600 MILLIGRAM(S): at 06:10

## 2021-10-31 RX ADMIN — Medication 975 MILLIGRAM(S): at 09:38

## 2021-10-31 RX ADMIN — Medication 600 MILLIGRAM(S): at 05:18

## 2021-10-31 RX ADMIN — Medication 975 MILLIGRAM(S): at 15:34

## 2021-10-31 NOTE — PROGRESS NOTE ADULT - ASSESSMENT
A/P: 32yo  with h/o rheumatoid arthritis POD#2 s/p repeat LTCS.  Patient is stable and doing well post-operatively.     - Rheumatoid arthritis: c/w home dose of Plaquenil 200mg BID  - Continue regular diet.  - Increase ambulation, encourage SCDs when not ambulating, Heparin for DVT ppx  - Continue motrin, tylenol, oxycodone PRN for pain control    DIPTI Arreola PGY1 A/P: 32yo  with h/o rheumatoid arthritis POD#2 s/p repeat LTCS.  Patient is stable and doing well post-operatively.     - Rheumatoid arthritis: c/w home dose of Plaquenil 200mg BID  - Continue regular diet.  - Increase ambulation, encourage SCDs when not ambulating, Heparin for DVT ppx  - Continue motrin, tylenol, oxycodone PRN for pain control    DIPTI Arreola PGY1    Attending:  Pt seen and examined. Agree with above. Pt complains of incisional pain. For dc home in am 11

## 2021-10-31 NOTE — PROGRESS NOTE ADULT - SUBJECTIVE AND OBJECTIVE BOX
OB Progress Note: LTCS, POD#2    S: 32yo POD#2 s/p LTCS. Pain is well controlled. She is tolerating a regular diet and passing flatus. She is voiding spontaneously, and ambulating without difficulty. Denies CP/SOB. Denies lightheadedness, dizziness, or N/V.    O:  Vitals:  Vital Signs Last 24 Hrs  T(C): 36.8 (31 Oct 2021 06:40), Max: 36.8 (30 Oct 2021 22:00)  T(F): 98.3 (31 Oct 2021 06:40), Max: 98.3 (30 Oct 2021 22:00)  HR: 83 (31 Oct 2021 06:40) (82 - 88)  BP: 112/55 (31 Oct 2021 06:40) (104/67 - 112/55)  BP(mean): --  RR: 18 (31 Oct 2021 06:40) (18 - 18)  SpO2: 100% (31 Oct 2021 06:40) (100% - 100%)    MEDICATIONS  (STANDING):  acetaminophen     Tablet .. 975 milliGRAM(s) Oral <User Schedule>  diphtheria/tetanus/pertussis (acellular) Vaccine (ADAcel) 0.5 milliLiter(s) IntraMuscular once  heparin   Injectable 5000 Unit(s) SubCutaneous every 12 hours  hydroxychloroquine 200 milliGRAM(s) Oral two times a day  ibuprofen  Tablet. 600 milliGRAM(s) Oral every 6 hours  influenza   Vaccine 0.5 milliLiter(s) IntraMuscular once  lactated ringers. 1000 milliLiter(s) (125 mL/Hr) IV Continuous <Continuous>  lactated ringers. 1000 milliLiter(s) (75 mL/Hr) IV Continuous <Continuous>  oxytocin Infusion 333.333 milliUNIT(s)/Min (1000 mL/Hr) IV Continuous <Continuous>      MEDICATIONS  (PRN):  diphenhydrAMINE 25 milliGRAM(s) Oral every 6 hours PRN Pruritus  lanolin Ointment 1 Application(s) Topical every 6 hours PRN Sore Nipples  magnesium hydroxide Suspension 30 milliLiter(s) Oral two times a day PRN Constipation  oxyCODONE    IR 5 milliGRAM(s) Oral every 3 hours PRN Moderate to Severe Pain (4-10)  oxyCODONE    IR 5 milliGRAM(s) Oral once PRN Moderate to Severe Pain (4-10)  simethicone 80 milliGRAM(s) Chew every 4 hours PRN Gas      Labs:  Blood type: O Positive  Rubella IgG: RPR: Negative                          9.5<L>   11.71<H> >-----------< 206    ( 10-30 @ 05:48 )             27.7<L>                        11.6   16.38<H> >-----------< 235    ( 10-29 @ 13:05 )             33.6<L>                        13.1   10.92<H> >-----------< 246    ( 10-29 @ 06:32 )             37.8          PE:  General: NAD  CV: RRR  Pulm: CTAB  Abdomen: Soft, appropriately tender, incision c/d/i. Fundus firm @umbilicus  Extremities: No calf tenderness, no pitting edema    
Pain Service Follow-up  Postop Day  1    S/P  C- Section    T(C): 36.7 (10-30-21 @ 14:00), Max: 37 (10-29-21 @ 21:10)  HR: 82 (10-30-21 @ 14:00) (71 - 84)  BP: 106/56 (10-30-21 @ 14:00) (101/51 - 107/57)  RR: 18 (10-30-21 @ 14:00) (16 - 18)  SpO2: 100% (10-30-21 @ 14:00) (98% - 100%)  Wt(kg): --      THERAPY:  Spinal Morphine     Sedation Score:	  [X] Alert	      [  ] Drowsy       [  ] Arousable	[  ] Asleep         [  ] Unresponsive    Side Effects:	  [X] None	      [  ] Nausea       [  ] Pruritus        [  ] Weakness   [  ] Numbness        ASSESSMENT/ PLAN   [ X ] Discontinue         [  ] Continue    [ X ] Documentation and Verification of current medications       Satisfactory Post Anesthetic Course    
OB Progress Note:  Delivery, POD#1    S: 30yo POD#1 s/p LTCS . Her pain is well controlled. She is tolerating a regular diet and passing flatus. Denies N/V. Denies CP/SOB/lightheadedness/dizziness. She is ambulating without difficulty. Voiding spontaneously.     O:   Vital Signs Last 24 Hrs  T(C): 36.4 (30 Oct 2021 06:30), Max: 37 (29 Oct 2021 21:10)  T(F): 97.5 (30 Oct 2021 06:30), Max: 98.6 (29 Oct 2021 21:10)  HR: 72 (30 Oct 2021 06:30) (71 - 84)  BP: 103/52 (30 Oct 2021 06:30) (96/49 - 116/61)  BP(mean): 69 (29 Oct 2021 13:30) (59 - 75)  RR: 18 (30 Oct 2021 06:30) (14 - 21)  SpO2: 100% (30 Oct 2021 06:30) (98% - 100%)    Labs:  Blood type: O Positive  Rubella IgG: RPR: Negative                          9.5<L>   11.71<H> >-----------< 206    ( 10-30 @ 05:48 )             27.7<L>                        11.6   16.38<H> >-----------< 235    ( 10-29 @ 13:05 )             33.6<L>                        13.1   10.92<H> >-----------< 246    ( 10-29 @ 06:32 )             37.8              acetaminophen     Tablet .. 975 milliGRAM(s) Oral <User Schedule>  diphenhydrAMINE 25 milliGRAM(s) Oral every 6 hours PRN  diphtheria/tetanus/pertussis (acellular) Vaccine (ADAcel) 0.5 milliLiter(s) IntraMuscular once  heparin   Injectable 5000 Unit(s) SubCutaneous every 12 hours  hydroxychloroquine 200 milliGRAM(s) Oral two times a day  ibuprofen  Tablet. 600 milliGRAM(s) Oral every 6 hours  influenza   Vaccine 0.5 milliLiter(s) IntraMuscular once  ketorolac   Injectable 30 milliGRAM(s) IV Push every 6 hours  lactated ringers. 1000 milliLiter(s) IV Continuous <Continuous>  lactated ringers. 1000 milliLiter(s) IV Continuous <Continuous>  lanolin Ointment 1 Application(s) Topical every 6 hours PRN  magnesium hydroxide Suspension 30 milliLiter(s) Oral two times a day PRN  oxyCODONE    IR 5 milliGRAM(s) Oral every 3 hours PRN  oxyCODONE    IR 5 milliGRAM(s) Oral once PRN  oxytocin Infusion 333.333 milliUNIT(s)/Min IV Continuous <Continuous>  simethicone 80 milliGRAM(s) Chew every 4 hours PRN      PE:  General: NAD  CV: RRR  Pulm: CTAB  Abdomen: Mildly distended, appropriately tender, incision c/d/i. Fundus firm @umbilicus  Extremities: No calf tenderness, no pitting edema

## 2021-11-03 ENCOUNTER — APPOINTMENT (OUTPATIENT)
Dept: OBGYN | Facility: CLINIC | Age: 31
End: 2021-11-03
Payer: COMMERCIAL

## 2021-11-03 ENCOUNTER — EMERGENCY (EMERGENCY)
Facility: HOSPITAL | Age: 31
LOS: 1 days | Discharge: HOME | End: 2021-11-03
Attending: EMERGENCY MEDICINE | Admitting: EMERGENCY MEDICINE
Payer: COMMERCIAL

## 2021-11-03 VITALS
TEMPERATURE: 98 F | SYSTOLIC BLOOD PRESSURE: 134 MMHG | RESPIRATION RATE: 16 BRPM | OXYGEN SATURATION: 100 % | HEART RATE: 73 BPM | DIASTOLIC BLOOD PRESSURE: 81 MMHG

## 2021-11-03 VITALS
OXYGEN SATURATION: 100 % | SYSTOLIC BLOOD PRESSURE: 124 MMHG | DIASTOLIC BLOOD PRESSURE: 75 MMHG | RESPIRATION RATE: 18 BRPM | HEIGHT: 62 IN | TEMPERATURE: 99 F | HEART RATE: 82 BPM

## 2021-11-03 DIAGNOSIS — Z01.818 ENCOUNTER FOR OTHER PREPROCEDURAL EXAMINATION: ICD-10-CM

## 2021-11-03 DIAGNOSIS — Z98.891 HISTORY OF UTERINE SCAR FROM PREVIOUS SURGERY: Chronic | ICD-10-CM

## 2021-11-03 DIAGNOSIS — Z98.890 OTHER SPECIFIED POSTPROCEDURAL STATES: Chronic | ICD-10-CM

## 2021-11-03 LAB
ALBUMIN SERPL ELPH-MCNC: 3.5 G/DL — SIGNIFICANT CHANGE UP (ref 3.3–5)
ALP SERPL-CCNC: 98 U/L — SIGNIFICANT CHANGE UP (ref 40–120)
ALT FLD-CCNC: 43 U/L — HIGH (ref 4–33)
ANION GAP SERPL CALC-SCNC: 12 MMOL/L — SIGNIFICANT CHANGE UP (ref 7–14)
APPEARANCE UR: CLEAR — SIGNIFICANT CHANGE UP
APTT BLD: 28.1 SEC — SIGNIFICANT CHANGE UP (ref 27–36.3)
AST SERPL-CCNC: 27 U/L — SIGNIFICANT CHANGE UP (ref 4–32)
BASOPHILS # BLD AUTO: 0.03 K/UL — SIGNIFICANT CHANGE UP (ref 0–0.2)
BASOPHILS NFR BLD AUTO: 0.3 % — SIGNIFICANT CHANGE UP (ref 0–2)
BILIRUB SERPL-MCNC: 0.3 MG/DL — SIGNIFICANT CHANGE UP (ref 0.2–1.2)
BILIRUB UR-MCNC: NEGATIVE — SIGNIFICANT CHANGE UP
BUN SERPL-MCNC: 8 MG/DL — SIGNIFICANT CHANGE UP (ref 7–23)
CALCIUM SERPL-MCNC: 9.9 MG/DL — SIGNIFICANT CHANGE UP (ref 8.4–10.5)
CHLORIDE SERPL-SCNC: 106 MMOL/L — SIGNIFICANT CHANGE UP (ref 98–107)
CO2 SERPL-SCNC: 22 MMOL/L — SIGNIFICANT CHANGE UP (ref 22–31)
COLOR SPEC: SIGNIFICANT CHANGE UP
CREAT SERPL-MCNC: 0.65 MG/DL — SIGNIFICANT CHANGE UP (ref 0.5–1.3)
DIFF PNL FLD: ABNORMAL
EOSINOPHIL # BLD AUTO: 0.06 K/UL — SIGNIFICANT CHANGE UP (ref 0–0.5)
EOSINOPHIL NFR BLD AUTO: 0.7 % — SIGNIFICANT CHANGE UP (ref 0–6)
GLUCOSE SERPL-MCNC: 99 MG/DL — SIGNIFICANT CHANGE UP (ref 70–99)
GLUCOSE UR QL: NEGATIVE — SIGNIFICANT CHANGE UP
HCT VFR BLD CALC: 29.3 % — LOW (ref 34.5–45)
HGB BLD-MCNC: 9.9 G/DL — LOW (ref 11.5–15.5)
IANC: 5.91 K/UL — SIGNIFICANT CHANGE UP (ref 1.5–8.5)
IMM GRANULOCYTES NFR BLD AUTO: 0.7 % — SIGNIFICANT CHANGE UP (ref 0–1.5)
INR BLD: 0.99 RATIO — SIGNIFICANT CHANGE UP (ref 0.88–1.16)
KETONES UR-MCNC: NEGATIVE — SIGNIFICANT CHANGE UP
LEUKOCYTE ESTERASE UR-ACNC: NEGATIVE — SIGNIFICANT CHANGE UP
LIDOCAIN IGE QN: 21 U/L — SIGNIFICANT CHANGE UP (ref 7–60)
LYMPHOCYTES # BLD AUTO: 2.1 K/UL — SIGNIFICANT CHANGE UP (ref 1–3.3)
LYMPHOCYTES # BLD AUTO: 23.9 % — SIGNIFICANT CHANGE UP (ref 13–44)
MCHC RBC-ENTMCNC: 29.3 PG — SIGNIFICANT CHANGE UP (ref 27–34)
MCHC RBC-ENTMCNC: 33.8 GM/DL — SIGNIFICANT CHANGE UP (ref 32–36)
MCV RBC AUTO: 86.7 FL — SIGNIFICANT CHANGE UP (ref 80–100)
MONOCYTES # BLD AUTO: 0.63 K/UL — SIGNIFICANT CHANGE UP (ref 0–0.9)
MONOCYTES NFR BLD AUTO: 7.2 % — SIGNIFICANT CHANGE UP (ref 2–14)
NEUTROPHILS # BLD AUTO: 5.91 K/UL — SIGNIFICANT CHANGE UP (ref 1.8–7.4)
NEUTROPHILS NFR BLD AUTO: 67.2 % — SIGNIFICANT CHANGE UP (ref 43–77)
NITRITE UR-MCNC: NEGATIVE — SIGNIFICANT CHANGE UP
NRBC # BLD: 0 /100 WBCS — SIGNIFICANT CHANGE UP
NRBC # FLD: 0 K/UL — SIGNIFICANT CHANGE UP
PH UR: 7 — SIGNIFICANT CHANGE UP (ref 5–8)
PLATELET # BLD AUTO: 318 K/UL — SIGNIFICANT CHANGE UP (ref 150–400)
POTASSIUM SERPL-MCNC: 4 MMOL/L — SIGNIFICANT CHANGE UP (ref 3.5–5.3)
POTASSIUM SERPL-SCNC: 4 MMOL/L — SIGNIFICANT CHANGE UP (ref 3.5–5.3)
PROT SERPL-MCNC: 6.5 G/DL — SIGNIFICANT CHANGE UP (ref 6–8.3)
PROT UR-MCNC: NEGATIVE — SIGNIFICANT CHANGE UP
PROTHROM AB SERPL-ACNC: 11.4 SEC — SIGNIFICANT CHANGE UP (ref 10.6–13.6)
RBC # BLD: 3.38 M/UL — LOW (ref 3.8–5.2)
RBC # FLD: 12.6 % — SIGNIFICANT CHANGE UP (ref 10.3–14.5)
SODIUM SERPL-SCNC: 140 MMOL/L — SIGNIFICANT CHANGE UP (ref 135–145)
SP GR SPEC: 1.01 — SIGNIFICANT CHANGE UP (ref 1–1.05)
UROBILINOGEN FLD QL: SIGNIFICANT CHANGE UP
WBC # BLD: 8.79 K/UL — SIGNIFICANT CHANGE UP (ref 3.8–10.5)
WBC # FLD AUTO: 8.79 K/UL — SIGNIFICANT CHANGE UP (ref 3.8–10.5)

## 2021-11-03 PROCEDURE — 0503F POSTPARTUM CARE VISIT: CPT

## 2021-11-03 PROCEDURE — 76705 ECHO EXAM OF ABDOMEN: CPT | Mod: 26

## 2021-11-03 PROCEDURE — 74177 CT ABD & PELVIS W/CONTRAST: CPT | Mod: 26,MA

## 2021-11-03 PROCEDURE — 99285 EMERGENCY DEPT VISIT HI MDM: CPT

## 2021-11-03 RX ORDER — MORPHINE SULFATE 50 MG/1
4 CAPSULE, EXTENDED RELEASE ORAL ONCE
Refills: 0 | Status: DISCONTINUED | OUTPATIENT
Start: 2021-11-03 | End: 2021-11-03

## 2021-11-03 RX ORDER — ONDANSETRON 8 MG/1
4 TABLET, FILM COATED ORAL ONCE
Refills: 0 | Status: COMPLETED | OUTPATIENT
Start: 2021-11-03 | End: 2021-11-03

## 2021-11-03 RX ORDER — SODIUM CHLORIDE 9 MG/ML
1000 INJECTION INTRAMUSCULAR; INTRAVENOUS; SUBCUTANEOUS ONCE
Refills: 0 | Status: COMPLETED | OUTPATIENT
Start: 2021-11-03 | End: 2021-11-03

## 2021-11-03 RX ADMIN — Medication 30 MILLILITER(S): at 05:48

## 2021-11-03 RX ADMIN — SODIUM CHLORIDE 1000 MILLILITER(S): 9 INJECTION INTRAMUSCULAR; INTRAVENOUS; SUBCUTANEOUS at 05:51

## 2021-11-03 RX ADMIN — ONDANSETRON 4 MILLIGRAM(S): 8 TABLET, FILM COATED ORAL at 05:49

## 2021-11-03 NOTE — ED ADULT NURSE NOTE - CHIEF COMPLAINT QUOTE
Pt. had  done on 10/29 c/o right sided chest pain and upper abd pain that began yesterday afternoon. Endorses chills. Denies fever, discharge from incision site.  Spoke to Genny from L&D who states pt. to be seen in ED.

## 2021-11-03 NOTE — ED PROVIDER NOTE - CLINICAL SUMMARY MEDICAL DECISION MAKING FREE TEXT BOX
31 yrs old  F with Past Medical History rheumatoid arthritis and anxiety that had an elective Csection 3 days ago here that presents with epigastric pain x 2 days. Per pt, she went home s/p Csection no complaints but since has developed epigastric pain radiating to RUQ x 2 days, worse eating, worse lying supine. Pt appears anxious and crying in ED but not due to pain, she is concerned about her baby being home but it is currently well taken care of by her partner and parents. Exam shows RUQ pain and epigastric pain and post gravid abdomen which is otherwise soft and not tense, Csection scar is healing well without any underlying signs of infection/abscess. Will Obtain labs, US and provide meds and reassess.

## 2021-11-03 NOTE — ED PROVIDER NOTE - ATTENDING CONTRIBUTION TO CARE
I, Dr Villa Salazar wrote the initial note in its entirety and the Fellow only contributed to the progress note and disposition with which I agree.

## 2021-11-03 NOTE — ED PROVIDER NOTE - CROS ED NEURO ALL NEG
"SUBJECTIVE:   Kristie Jones is a 72 year old female who presents for Preventive Visit.      Patient has been advised of split billing requirements and indicates understanding: Yes   Are you in the first 12 months of your Medicare coverage?  No    Healthy Habits:     In general, how would you rate your overall health?  Good    Frequency of exercise:  6-7 days/week    Duration of exercise:  30-45 minutes    Do you usually eat at least 4 servings of fruit and vegetables a day, include whole grains    & fiber and avoid regularly eating high fat or \"junk\" foods?  No    Taking medications regularly:  Yes    Medication side effects:  Muscle aches, Lightheadedness and Other    Ability to successfully perform activities of daily living:  No assistance needed    Home Safety:  Throw rugs in the hallway    Hearing Impairment:  No hearing concerns    In the past 6 months, have you been bothered by leaking of urine?  No    In general, how would you rate your overall mental or emotional health?  Fair      PHQ-2 Total Score: 0    Additional concerns today:  Yes    Do you feel safe in your environment? Yes    Have you ever done Advance Care Planning? (For example, a Health Directive, POLST, or a discussion with a medical provider or your loved ones about your wishes): Yes, advance care planning is on file.    Admitted overnight 12/26/20 for chest pain. D-dimer elevated. CT PE negative for PE but showed small pleural effusion. ECHO showed systolic dysfunction, EF 32%. Unchanged from previous.   She was given one dose of omeprazole but not discharged on int  She does complain of a lot of heart burn  She had normal endoscopy in 2004  Not currently on any medications    She complains of feeling \"foggy\" for the past 8 months  Heaviness in her eyes  Feels sleepy  Doesn't sleep well, hasn't for years  Wears her cpap  Hx insomnia. We tried amitriptyline previously and DC'd d/t morning dizziness though medication was effective  Trouble " falling asleep and frequent night time wakening  No word finding difficulty  Last eye exam Oct 2020    Takes loratadine at night  Still has a runny nose    She states vision isn't good but has eye exam annually  Last exam Oct 2020     Fall risk  Fallen 2 or more times in the past year?: No  Any fall with injury in the past year?: No    Cognitive Screening   1) Repeat 3 items (Leader, Season, Table)    2) Clock draw: NORMAL  3) 3 item recall: Recalls 2 objects   Results: NORMAL clock, 1-2 items recalled: COGNITIVE IMPAIRMENT LESS LIKELY    Mini-CogTM Copyright S Niko. Licensed by the author for use in [facility]; reprinted with permission (soob@[facility]). All rights reserved.      Do you have sleep apnea, excessive snoring or daytime drowsiness?: yes    Reviewed and updated as needed this visit by clinical staff                 Reviewed and updated as needed this visit by Provider                Social History     Tobacco Use     Smoking status: Never Smoker     Smokeless tobacco: Never Used   Substance Use Topics     Alcohol use: Yes     Alcohol/week: 0.0 standard drinks     Frequency: Monthly or less     Drinks per session: 1 or 2     Binge frequency: Never     If you drink alcohol do you typically have >3 drinks per day or >7 drinks per week? No    Alcohol Use 2/2/2021   Prescreen: >3 drinks/day or >7 drinks/week? No   Prescreen: >3 drinks/day or >7 drinks/week? -         Current providers sharing in care for this patient include:   Patient Care Team:  Julianne Thornton APRN CNP as PCP - General (Nurse Practitioner - Adult Health)  Pepper Baugh MD as MD (Internal Medicine)  Julianne Thornton APRN CNP as Assigned PCP  Ip, Ciaran Montano MD as Assigned Heart and Vascular Provider    The following health maintenance items are reviewed in Epic and correct as of today:  Health Maintenance   Topic Date Due     ANNUAL REVIEW OF HM ORDERS  1948     DIABETIC FOOT EXAM  02/16/2017      EYE EXAM  08/08/2017     HF ACTION PLAN  02/16/2019     MEDICARE ANNUAL WELLNESS VISIT  08/06/2020     FALL RISK ASSESSMENT  08/06/2020     INFLUENZA VACCINE (1) 09/01/2020     PHQ-9  09/10/2020     BMP  06/26/2021     A1C  07/25/2021     MAMMO SCREENING  10/13/2021     ALT  12/26/2021     CBC  12/26/2021     LIPID  01/25/2022     MICROALBUMIN  01/25/2022     DEXA  08/19/2022     ADVANCE CARE PLANNING  02/20/2023     DTAP/TDAP/TD IMMUNIZATION (4 - Td) 02/20/2028     COLORECTAL CANCER SCREENING  04/04/2028     TSH W/FREE T4 REFLEX  Completed     HEPATITIS C SCREENING  Completed     DEPRESSION ACTION PLAN  Completed     MIGRAINE ACTION PLAN  Completed     Pneumococcal Vaccine: 65+ Years  Completed     ZOSTER IMMUNIZATION  Completed     Pneumococcal Vaccine: Pediatrics (0 to 5 Years) and At-Risk Patients (6 to 64 Years)  Aged Out     IPV IMMUNIZATION  Aged Out     MENINGITIS IMMUNIZATION  Aged Out     Lab work is in process  Labs reviewed in EPIC  BP Readings from Last 3 Encounters:   02/05/21 106/60   12/27/20 107/64   12/26/20 100/58    Wt Readings from Last 3 Encounters:   02/05/21 93.3 kg (205 lb 11.2 oz)   12/26/20 92.7 kg (204 lb 6.4 oz)   03/10/20 92.9 kg (204 lb 11.2 oz)                  Patient Active Problem List   Diagnosis     GENERAL OSTEOARTHROSIS [715.00]     Esophageal reflux     Hyperlipidemia LDL goal <130     LBBB (left bundle branch block)     Advanced directives, counseling/discussion     SUSAN (obstructive sleep apnea)     Idiopathic cardiomyopathy (H)     Cataract     Leg pain     Insomnia     Obesity     S/P total  right knee replacement: 1/7/13     Generalized anxiety disorder     Total knee replacement status     S/P revision of total knee, right 8/26/15     Major depressive disorder, recurrent episode, mild (H)     ICD (implantable cardioverter-defibrillator), biventricular, in situ     Type 2 diabetes mellitus without complication (H)     Chronic systolic congestive heart failure (H)      Morbid obesity (H)     Chest pain, unspecified type     Past Surgical History:   Procedure Laterality Date     APPENDECTOMY       ARTHROPLASTY KNEE  1/7/2013    Procedure: ARTHROPLASTY KNEE;  Right Total Knee Arthroplasty       ARTHROPLASTY REVISION KNEE Right 8/26/2015    Procedure: ARTHROPLASTY REVISION KNEE;  Surgeon: Néstor Beth MD;  Location: [facility] OR     ARTHROSCOPY KNEE       bunionectomy left foot       COLONOSCOPY       CORONARY ANGIOGRAPHY ADULT ORDER  2002    normal     CORONARY ANGIOGRAPHY ADULT ORDER  12/7/12    no significant focal narrowing that would benefit from mechanical intervention      HYSTERECTOMY       IMPLANT AUTOMATIC IMPLANTABLE CARDIOVERTER DEFIBRILLATOR  4/30/12     INSERT THORACIC PACEMAKER LEAD EPICARDIAL  5/1/2012    Procedure:INSERT THORACIC PACEMAKER LEAD EPICARDIAL; EPICARDIAL LEAD PLACEMENT; Surgeon:WEST ARECHIGA; Location:[facility] OR     TONSILLECTOMY       TRANSPLANT - corneal lenses      Bilateral for cataracts       Social History     Tobacco Use     Smoking status: Never Smoker     Smokeless tobacco: Never Used   Substance Use Topics     Alcohol use: Yes     Alcohol/week: 0.0 standard drinks     Frequency: Monthly or less     Drinks per session: 1 or 2     Binge frequency: Never     Family History   Problem Relation Age of Onset     Cancer Father         intraabdominal mass - not colon cancer     C.A.D. Mother      Hypertension Mother      Lipids Mother      Heart Disease Mother      Cancer Daughter 36        brain      Diabetes Paternal Uncle 52     Prostate Cancer Brother      Heart Disease Sister         murmur     Anxiety Disorder Sister      Colon Cancer No family hx of          Current Outpatient Medications   Medication Sig Dispense Refill     carvedilol (COREG) 25 MG tablet TAKE 1 TABLET BY MOUTH TWICE DAILY WITH MEALS 180 tablet 3     losartan (COZAAR) 50 MG tablet Take 1 tablet (50 mg) by mouth daily 90 tablet 3     mirtazapine (REMERON) 7.5 MG tablet Take 1-2  tablets 30 minutes before bedtime 60 tablet 3     rosuvastatin (CRESTOR) 10 MG tablet TAKE 1 TABLET EVERY OTHER DAY 45 tablet 3     spironolactone (ALDACTONE) 25 MG tablet Take 0.5 tablets (12.5 mg) by mouth daily 90 tablet 3     venlafaxine (EFFEXOR-XR) 75 MG 24 hr capsule TAKE 3 CAPSULES BY MOUTH ONCE DAILY 270 capsule 3     acetaminophen (TYLENOL) 500 MG tablet Take 1,000 mg by mouth daily as needed for mild pain       ALPRAZolam (XANAX) 0.25 MG tablet Take 1 tablet (0.25 mg) by mouth 2 times daily as needed for anxiety 30 tablet 1     aspirin 81 MG tablet Take 81 mg by mouth daily       CALCIUM 500 MG OR TABS Takes 3 tabs daily takees total of 1200 mg daily       Cholecalciferol (VITAMIN D) 2000 UNITS tablet Take 1 tablet by mouth daily.       ciprofloxacin (CIPRO) 100 MG tablet Take 100 mg by mouth daily as needed For dental appointments       clobetasol (TEMOVATE) 0.05 % external cream Apply thin layer to bilateral hand twice daily for 2 weeks 45 g 1     co-enzyme Q-10 50 MG CAPS Take 1 capsule by mouth daily.       lifitegrast (XIIDRA) 5 % opthalmic solution Place 1 drop into both eyes 2 times daily       loratadine (CLARITIN) 10 MG tablet Take 10 mg by mouth daily       Multiple Vitamins-Minerals (MULTIVITAMIN ADULT PO) Take 1 tablet by mouth daily       Mammogram Screening: Recommended mammography every 1-2 years with patient discussion and risk factor consideration    Review of Systems   Constitutional: Negative for chills and fever.   HENT: Negative for congestion, ear pain, hearing loss and sore throat.    Eyes: Positive for visual disturbance. Negative for pain.   Respiratory: Positive for cough and shortness of breath.    Cardiovascular: Negative for chest pain, palpitations and peripheral edema.   Gastrointestinal: Positive for heartburn and nausea. Negative for abdominal pain, constipation, diarrhea and hematochezia.   Breasts:  Negative for tenderness, breast mass and discharge.   Genitourinary:  "Negative for dysuria, frequency, genital sores, hematuria, pelvic pain, urgency, vaginal bleeding and vaginal discharge.   Musculoskeletal: Positive for arthralgias and myalgias. Negative for joint swelling.   Skin: Negative for rash.   Neurological: Positive for headaches. Negative for dizziness, weakness and paresthesias.   Psychiatric/Behavioral: Negative for mood changes. The patient is nervous/anxious.        OBJECTIVE:   /60 (BP Location: Right arm, Patient Position: Chair, Cuff Size: Adult Large)   Pulse 70   Temp 97.9  F (36.6  C) (Tympanic)   Ht 1.632 m (5' 4.25\")   Wt 93.3 kg (205 lb 11.2 oz)   SpO2 97%   BMI 35.03 kg/m   Estimated body mass index is 35.07 kg/m  as calculated from the following:    Height as of 12/26/20: 1.626 m (5' 4.02\").    Weight as of 12/26/20: 92.7 kg (204 lb 6.4 oz).  Physical Exam  GENERAL: healthy, alert and no distress  EYES: Eyes grossly normal to inspection, PERRL and conjunctivae and sclerae normal  HENT: ear canals and TM's normal  NECK: no adenopathy, no asymmetry, masses, or scars and thyroid normal to palpation  RESP: lungs clear to auscultation - no rales, rhonchi or wheezes  CV: regular rate and rhythm, normal S1 S2, no S3 or S4, no murmur, click or rub, no peripheral edema and peripheral pulses strong  ABDOMEN: soft, nontender, no hepatosplenomegaly, no masses and bowel sounds normal  MS: no gross musculoskeletal defects noted, no edema  SKIN: no suspicious lesions or rashes  NEURO: Normal strength and tone, mentation intact and speech normal  PSYCH: mentation appears normal, affect normal/bright    Diagnostic Test Results:  Labs reviewed in Epic    ASSESSMENT / PLAN:   1. Encounter for preventative adult health care exam with abnormal findings  - Basic metabolic panel  (Ca, Cl, CO2, Creat, Gluc, K, Na, BUN)    2. Cardiomyopathy, unspecified type (H)  - Overdue to f/u with cardiology. Advised to schedule. BP on low end, though generally well controlled. No " "lightheadedness, so I don't know if \"fogginess\" is symptoms of low blood pressure or BB.   - CARDIOLOGY EVAL ADULT REFERRAL; Future  - Basic metabolic panel  (Ca, Cl, CO2, Creat, Gluc, K, Na, BUN)    3. Major depressive disorder, recurrent episode, mild (H)  - Well controlled. She has not seen therapist since Covid. Feels she is doing ok, though gets bored. May be contributing to \"foggines\". Recommend schedule virtual visit with therapist    4. Morbid obesity (H)  - Recommend increasing physical activity. Buy hand weights at [employer] and find some free videos online. Outside walk on nice days    5. Type 2 diabetes mellitus without complication, without long-term current use of insulin (H)  - Stable. A1c 6.1% at last check    6. Idiopathic cardiomyopathy (H)  - as above  - carvedilol (COREG) 25 MG tablet; TAKE 1 TABLET BY MOUTH TWICE DAILY WITH MEALS  Dispense: 180 tablet; Refill: 3  - spironolactone (ALDACTONE) 25 MG tablet; Take 0.5 tablets (12.5 mg) by mouth daily  Dispense: 90 tablet; Refill: 3  - losartan (COZAAR) 50 MG tablet; Take 1 tablet (50 mg) by mouth daily  Dispense: 90 tablet; Refill: 3    7. Generalized anxiety disorder  - as above  - venlafaxine (EFFEXOR-XR) 75 MG 24 hr capsule; TAKE 3 CAPSULES BY MOUTH ONCE DAILY  Dispense: 270 capsule; Refill: 3    8. Hyperlipidemia LDL goal <130  - tolerates when taking every other day   - rosuvastatin (CRESTOR) 10 MG tablet; TAKE 1 TABLET EVERY OTHER DAY  Dispense: 45 tablet; Refill: 3    9. Insomnia, unspecified type  - Likely contributing to mental \"fogginess\". Neuro exam otherwise normal. No signs of cognitive impairment. Discussed sleep hygiene. Recommend low dose mirtazapine. Reviewed risks and benefits. If tolerating and effective, send MyChart update  - mirtazapine (REMERON) 7.5 MG tablet; Take 1-2 tablets 30 minutes before bedtime  Dispense: 60 tablet; Refill: 3    10. Fatigue, unspecified type  - Likely d/t poor sleep. If no improvement with above " "recommendation, consider whether SE of BB. Can discuss with BB  - CBC with platelets  - TSH with free T4 reflex  - Vitamin B12    11. Vitamin D deficiency  - On daily low dose so should be normal, but check given reported fatigue  - Vitamin D Deficiency    Patient has been advised of split billing requirements and indicates understanding: Yes  COUNSELING:  Reviewed preventive health counseling, as reflected in patient instructions       Regular exercise       Healthy diet/nutrition    Estimated body mass index is 35.07 kg/m  as calculated from the following:    Height as of 12/26/20: 1.626 m (5' 4.02\").    Weight as of 12/26/20: 92.7 kg (204 lb 6.4 oz).    Weight management plan: Discussed healthy diet and exercise guidelines    She reports that she has never smoked. She has never used smokeless tobacco.      Appropriate preventive services were discussed with this patient, including applicable screening as appropriate for cardiovascular disease, diabetes, osteopenia/osteoporosis, and glaucoma.  As appropriate for age/gender, discussed screening for colorectal cancer, prostate cancer, breast cancer, and cervical cancer. Checklist reviewing preventive services available has been given to the patient.    Reviewed patients plan of care and provided an AVS. The Basic Care Plan (routine screening as documented in Health Maintenance) for Kristie meets the Care Plan requirement. This Care Plan has been established and reviewed with the Patient.    Counseling Resources:  ATP IV Guidelines  Pooled Cohorts Equation Calculator  Breast Cancer Risk Calculator  Breast Cancer: Medication to Reduce Risk  FRAX Risk Assessment  ICSI Preventive Guidelines  Dietary Guidelines for Americans, 2010  USDA's MyPlate  ASA Prophylaxis  Lung CA Screening    WINNIE Poe [facility]    Identified Health Risks:  " negative...

## 2021-11-03 NOTE — ED PROVIDER NOTE - CARE PROVIDER_API CALL
Villa Muhammad  OBSTETRICS AND GYNECOLOGY  [address] [place] [id number]  Phone: [phone]  Fax: [phone]  Follow Up Time:

## 2021-11-03 NOTE — ED PROVIDER NOTE - OBJECTIVE STATEMENT
31 yrs old  F with Past Medical History rheumatoid arthritis and anxiety that had an elective Csection 3 days ago here that presents with epigastric pain x 2 days. Per pt, she went home s/p Csection no complaints but since has developed epigastric pain radiating to RUQ x 2 days, worse eating, worse lying supine for long periods, improved with walking/standing. Has been told to take tylenol and motrin but has not for this pain. Denies fever, chest pain, SOB, urinary symptoms, trauma, falls. Denies any discharge or bleeding from Csection site/scar. Does report that she has had increased belching which sometimes relieves pain, has also had nausea without vomiting. Has been able to have BMs and flatus. No other complaints.

## 2021-11-03 NOTE — ED PROVIDER NOTE - PROGRESS NOTE DETAILS
MILENA: pt pain improved at this time to 1/10 compared to prior which was 10/10 initially. She refused the morphine as well as she was worried about breast feeding. She thinks the maalox helped the most. US was completed and showed no acute pathology with US. Will continue to monitor pending labs and likely will obtain a OB consult when labs return. Adrian Marti MD: Genny Lerner is a 30yo F,  reporting to ED with abdominal pain. Please see Dr. Salazar's note for more information regarding this patient's workup to date. Patient received Maalox as well as labs pending. RUQ US was unremarkable. Adrian Marti MD: Genny Lerner is a 30yo F,  reporting to ED with abdominal pain. Please see Dr. Salazar's note for more information regarding this patient's workup to date. Patient received Maalox as well as labs pending. RUQ US was unremarkable. Of note, patient denies chest pain, shortness of breath, she is not tachycardic. I have low suspicion for PE. Adrian Marti MD: Genny Lerner is a 30yo F,  reporting to ED with abdominal pain. Please see Dr. Salazar's note for more information regarding this patient's workup to date. Patient received Maalox as well as labs pending. RUQ US was unremarkable. Of note, patient denies chest pain, shortness of breath, she is not tachycardic. I have low suspicion for PE. Abdominal examination on my exam is soft, incision is well healed, no surrounding erythema, mild RLQ TTP. Low suspicion for appendicitis. Patient's pain relieved with Maalox, she did not want the morphine. CBC w/ no evidence of leukocytosis. Adrian Marti MD: Genny Lerner is a 30yo F,  reporting to ED with abdominal pain. Please see Dr. Salazar's note for more information regarding this patient's workup to date. Patient received Maalox as well as labs pending. RUQ US was unremarkable. Of note, patient denies chest pain, shortness of breath, she is not tachycardic. I have low suspicion for PE. Abdominal examination on my exam is soft, incision is well healed, no surrounding erythema, mild RLQ TTP. Low suspicion for appendicitis, however tenderness is present. Concerned given proximity to C/S, will obtain CT scan. Spoke with OBGYN who evaluated patient at the bedside. Dr. Jacobs evaluated patient. Of note, pain relieved with Maalox, she did not want the morphine. CBC w/ no evidence of leukocytosis. Low suspicison for intra-abdominal infection. Plan for CT for re-call to OBGYN to discuss. Adrian Marti MD: Genny Lerner is a 32yo F,  reporting to ED with abdominal pain. Please see Dr. Salazar's note for more information regarding this patient's workup to date. Patient received Maalox as well as labs pending. RUQ US was unremarkable. Of note, patient denies chest pain, shortness of breath, she is not tachycardic. I have low suspicion for PE. Abdominal examination on my exam is soft, incision is well healed, no surrounding erythema, mild RLQ TTP. Low suspicion for appendicitis, however tenderness is present. Concerned given proximity to C/S, will obtain CT scan. Spoke with OBGYN who evaluated patient at the bedside. Dr. Jacosb evaluated patient. Of note, pain relieved with Maalox, she did not want the morphine. CBC w/ no evidence of leukocytosis. Low suspicison for intra-abdominal infection. Plan for CT for re-call to OBGYN to discuss. CT w/ evidence of 4.5cm hematoma present on the lateral portion of the incision, point of tenderness. Discussed again via phone w/ Dr. Lees ([phone]), who recommended out-patient follow up and NSAID use for pain control. Discussed with patient, advised good pain control, and that symptoms evolve and should she have worsening pain, lightheadedness, abdominal pain, the urgency of her physical ED evaluation. Patient was feeling better, agreed with plan, and was ultimately discharged in stable condition. Adrian Marti MD: Genny Lerner is a 32yo F,  reporting to ED with abdominal pain. Please see Dr. Salazar's note for more information regarding this patient's workup to date. Patient received Maalox as well as labs pending. RUQ US was unremarkable. Of note, patient denies chest pain, shortness of breath, she is not tachycardic. I have low suspicion for PE. Abdominal examination on my exam is soft, incision is well healed, no surrounding erythema, mild RLQ TTP. Low suspicion for appendicitis, however tenderness is present. Concerned given proximity to C/S, will obtain CT scan. Spoke with OBGYN who evaluated patient at the bedside. Dr. Jacobs evaluated patient. Of note, pain relieved with Maalox, she did not want the morphine. CBC w/ no evidence of leukocytosis. Low suspicison for intra-abdominal infection. Plan for CT for re-call to OBGYN to discuss. CT w/ evidence of 4.5cm hematoma present on the lateral portion of the incision, point of tenderness. Discussed again via phone w/ Dr. Lees ([phone]), who recommended out-patient follow up and NSAID use for pain control. Discussed with patient, advised good pain control, and that symptoms evolve and should she have worsening pain, lightheadedness, abdominal pain, the urgency of her physical ED evaluation. Patient was feeling better, agreed with plan, and was ultimately discharged in stable condition..

## 2021-11-03 NOTE — ED ADULT TRIAGE NOTE - CHIEF COMPLAINT QUOTE
Pt. had  done on 10/29 c/o right sided chest pain and upper abd pain that began yesterday afternoon. Endorses chills. Denies fever, discharge from incision site. Pt. had  done on 10/29 c/o right sided chest pain and upper abd pain that began yesterday afternoon. Endorses chills. Denies fever, discharge from incision site.  Spoke to Genny from L&D who states pt. to be seen in ED.

## 2021-11-03 NOTE — ED PROVIDER NOTE - SKIN, MLM
Skin normal color for race, warm, dry and intact. No evidence of rash. Csection scar healing well, no discharge/dehiscence, no fluctuance, no crepitus, no surrounding erythema.

## 2021-11-03 NOTE — ED ADULT NURSE NOTE - CAS DISCH TRANSFER METHOD
Private car Mercedes Flap Text: The defect edges were debeveled with a #15 scalpel blade.  Given the location of the defect, shape of the defect and the proximity to free margins a Mercedes flap was deemed most appropriate.  Using a sterile surgical marker, an appropriate advancement flap was drawn incorporating the defect and placing the expected incisions within the relaxed skin tension lines where possible. The area thus outlined was incised deep to adipose tissue with a #15 scalpel blade.  The skin margins were undermined to an appropriate distance in all directions utilizing iris scissors.

## 2021-11-03 NOTE — ED PROVIDER NOTE - PATIENT PORTAL LINK FT
You can access the FollowMyHealth Patient Portal offered by [facility] by registering at the following website: [email or web address]. By joining [employer]’s FollowMyHealth portal, you will also be able to view your health information using other applications (apps) compatible with our system.

## 2021-11-03 NOTE — ED ADULT NURSE REASSESSMENT NOTE - NS ED NURSE REASSESS COMMENT FT1
pt. A&Ox4, awake and resting. pt. denies any pain at this time. Surgical site's edges are approximated, no purulent drainage noted, non tender and no erythema noted. No signs of active bleeding or infection. Pt. pending CT abdomen and pelvis. VS as noted.

## 2021-11-03 NOTE — ED PROVIDER NOTE - NSFOLLOWUPINSTRUCTIONS_ED_ALL_ED_FT
As we discussed, please follow up with OBGYN at the below number. Please take ibuprofen and tylenol every 6-8 hours. Please return with increased pain, nausea/vomiting, or lightheadedness.

## 2021-11-04 PROBLEM — Z01.818 PRE-OP TESTING: Status: RESOLVED | Noted: 2021-10-26 | Resolved: 2021-11-04

## 2021-11-04 NOTE — PHYSICAL EXAM
[Appropriately responsive] : appropriately responsive [FreeTextEntry7] : incision intact and well approximated. No draingage or pus noted. Brusing noted above incisional site, tender to touch.

## 2021-11-04 NOTE — HISTORY OF PRESENT ILLNESS
[FreeTextEntry1] : 32 yo female s/p c/s on 10/28 presents today c/o incisional pain. Pt was seen in the Emergency room this morning and was told she had a hematoma at the incision site. Patient has been taking Motrin for the pain with little relief.

## 2021-11-08 NOTE — ED POST DISCHARGE NOTE - REASON FOR FOLLOW-UP
Urine culture 50-99,000 e coli,    Patient is asymptomaytic. mot pregnant, preferse to hold off on AB until sees her gyn in 2 days.  Advised to repeat her urine/cuturel with her gyn in 2 days if any symptoms can return to ED. Other

## 2021-11-10 ENCOUNTER — APPOINTMENT (OUTPATIENT)
Dept: OBGYN | Facility: CLINIC | Age: 31
End: 2021-11-10
Payer: COMMERCIAL

## 2021-11-10 VITALS — SYSTOLIC BLOOD PRESSURE: 112 MMHG | DIASTOLIC BLOOD PRESSURE: 73 MMHG | WEIGHT: 175 LBS | BODY MASS INDEX: 32.01 KG/M2

## 2021-11-10 PROCEDURE — 0503F POSTPARTUM CARE VISIT: CPT

## 2021-11-10 NOTE — PLAN
[FreeTextEntry1] : 32 y/o F presenting for incision check\par -Incision healing well\par -RX sent to pharmacy for Keflex, as patient had UTI in hosptal\par -f/u for 6 week PP visit\par

## 2021-11-10 NOTE — PHYSICAL EXAM
[Chaperone Present] : A chaperone was present in the examining room during all aspects of the physical examination [Soft] : soft [Non-distended] : non-distended [No Mass] : no mass [FreeTextEntry1] : Jessica Mac [FreeTextEntry7] : incision intact and well approximated. No erythema or drainage noted.

## 2021-11-10 NOTE — HISTORY OF PRESENT ILLNESS
[FreeTextEntry1] : 32 y/o F s/p C/S on 10/29/21 presenting for incision check. Liveborn male. She is breastfeeding and bottle feeding.\par \par

## 2021-11-14 ENCOUNTER — EMERGENCY (EMERGENCY)
Facility: HOSPITAL | Age: 31
LOS: 1 days | Discharge: HOME | End: 2021-11-14
Attending: EMERGENCY MEDICINE | Admitting: EMERGENCY MEDICINE
Payer: COMMERCIAL

## 2021-11-14 VITALS
SYSTOLIC BLOOD PRESSURE: 109 MMHG | HEART RATE: 81 BPM | DIASTOLIC BLOOD PRESSURE: 75 MMHG | RESPIRATION RATE: 14 BRPM | TEMPERATURE: 98 F | OXYGEN SATURATION: 99 %

## 2021-11-14 VITALS
OXYGEN SATURATION: 99 % | WEIGHT: 149.91 LBS | RESPIRATION RATE: 16 BRPM | SYSTOLIC BLOOD PRESSURE: 117 MMHG | HEIGHT: 62 IN | TEMPERATURE: 98 F | DIASTOLIC BLOOD PRESSURE: 75 MMHG | HEART RATE: 88 BPM

## 2021-11-14 DIAGNOSIS — Z98.891 HISTORY OF UTERINE SCAR FROM PREVIOUS SURGERY: Chronic | ICD-10-CM

## 2021-11-14 DIAGNOSIS — Z98.890 OTHER SPECIFIED POSTPROCEDURAL STATES: Chronic | ICD-10-CM

## 2021-11-14 PROCEDURE — 99283 EMERGENCY DEPT VISIT LOW MDM: CPT

## 2021-11-14 NOTE — ED PROVIDER NOTE - NSFOLLOWUPINSTRUCTIONS_ED_ALL_ED_FT
continue pressure dressing  continue warm compresses and NSAID's over the counter  have close follow up with ob-gyn       Wound Dehiscence    WHAT YOU NEED TO KNOW:    Wound dehiscence is when part or all of a wound comes apart. You may need medicine, wound care, surgery, or wound devices to help treat your wound. Wound dehiscence can become life-threatening.     DISCHARGE INSTRUCTIONS:    Return to the emergency department if:   •Your heart is beating faster than usual, or you feel dizzy or lightheaded.      •Blood soaks through your bandage.      •You see tissue coming through your wound.       •You feel like your wound is opening up more.       •Your wound oozes yellow or green pus, looks swollen or red, or feels warm.      Contact your healthcare provider or surgeon if:   •You have a fever or chills.      •Your wound leaks fluid or a small amount of blood.      •Your pain gets worse or does not get better after you take pain medicine.      •You have nausea or are vomiting.       •You have questions or concerns about your condition or care.      Medicines:   •Antibiotics help treat a bacterial infection.       •Prescription pain medicine may be given. Ask your healthcare provider how to take this medicine safely. Some prescription pain medicines contain acetaminophen. Do not take other medicines that contain acetaminophen without talking to your healthcare provider. Too much acetaminophen may cause liver damage. Prescription pain medicine may cause constipation. Ask your healthcare provider how to prevent or treat constipation.       •Take your medicine as directed. Contact your healthcare provider if you think your medicine is not helping or if you have side effects. Tell him or her if you are allergic to any medicine. Keep a list of the medicines, vitamins, and herbs you take. Include the amounts, and when and why you take them. Bring the list or the pill bottles to follow-up visits. Carry your medicine list with you in case of an emergency.      Wound care:   •Wash your hands often. Use soap and water. Wash your hands before and after you touch your wound. This will help to prevent an infection.       •Clean your wound as directed. Ask your healthcare provider if it okay to shower or take a bath. Let the soap and water run over your wound. Gently pat the area dry. Look for signs of infection, such as redness, swelling, or pus.       •Change your bandages as directed. Replace bandages after you clean the wound or bathe. Change your bandages when they get wet or dirty. If directed, pack your wound. Change the packing as directed.       •Do not swim or go in hot tubs until your healthcare provider says it is okay. Hot tubs and pools can cause infection and prevent wound healing.       •Wear your binder or splint at all times or as directed. These devices help hold your wound together.       •Use devices as directed to help the wound heal. Your healthcare provider will show you how to care for your wound device.       Self-care to promote healing:   •Rest as directed. Do not lift anything heavier than 5 pounds. Do not do activities that may put stress on your wound, such as running or sports. Ask your healthcare provider when you can return to your usual activities.       •Eat foods high in protein. Protein will help your wound heal. Protein can be found in lean meat, fish, beans, and low-fat dairy. Your healthcare provider may also recommend certain drinks for added protein.      •Do not smoke. Nicotine and other chemicals in cigarettes and cigars can prevent your wound from healing. Ask your healthcare provider for information if you currently smoke and need help to quit. E-cigarettes or smokeless tobacco still contain nicotine. Talk to your healthcare provider before you use these products.       Follow up with your healthcare provider or surgeon as directed: You will need to return to have your wound checked. Write down your questions so you remember to ask them during your visits.

## 2021-11-14 NOTE — ED PROVIDER NOTE - SKIN, MLM
surgical scar appears to be healing well with less than 1 cm area of slight dehiscence to right side. small amount of dried blood surrounding surgical scar. no active bleeding. unable to express more blood or drainage with palpation

## 2021-11-14 NOTE — ED PROVIDER NOTE - CLINICAL SUMMARY MEDICAL DECISION MAKING FREE TEXT BOX
presents with bleeding from  surgical wound last night and today (less today per patient). diagnosed with 4.5 cm hematoma to right lower abd on  by CT at [facility] ED. suspect bleeding from hematoma and slight dehiscence. overall incision appears well and healing. recommend to continue pressure bandages as previously recommended, warm compresses, and NSAID's. recommend close ob-gyn follow up

## 2021-11-14 NOTE — ED PROVIDER NOTE - PATIENT PORTAL LINK FT
You can access the FollowMyHealth Patient Portal offered by [facility] by registering at the following website: [email or web address]. By joining [employer]’s FollowMyHealth portal, you will also be able to view your health information using other applications (apps) compatible with our system.

## 2021-11-14 NOTE — ED PROVIDER NOTE - NSICDXPASTSURGICALHX_GEN_ALL_CORE_FT
PAST SURGICAL HISTORY:  H/O right knee surgery 2019 for meniscus tear    Previous  section        no

## 2021-11-14 NOTE — ED ADULT NURSE NOTE - OBJECTIVE STATEMENT
c/o had a C Section on 10/29/21 and is here for f/u because the right side of her incision is bleeding since yesterday, called her OB and was advised to put pressure it and it should stop. Told it was most likely the hematoma that was found on CT on Wednesdays @ [facility] ED. Patient states bleeding has decreased not she was given the option to return to the ED or follow up in the office.

## 2021-11-14 NOTE — ED ADULT NURSE NOTE - NSIMPLEMENTINTERV_GEN_ALL_ED
Implemented All Universal Safety Interventions:  [place] to call system. Call bell, personal items and telephone within reach. Instruct patient to call for assistance. Room bathroom lighting operational. Non-slip footwear when patient is off stretcher. Physically safe environment: no spills, clutter or unnecessary equipment. Stretcher in lowest position, wheels locked, appropriate side rails in place.

## 2021-11-14 NOTE — ED PROVIDER NOTE - CARE PROVIDER_API CALL
West Lopez)  MaternalFetal Medicine; Obstetrics and Gynecology  [address] [place] [id number]  Phone: [phone]  Fax: [phone]  Follow Up Time: 1-3 Days

## 2021-11-14 NOTE — ED ADULT NURSE NOTE - ASSOCIATED SYMPTOMS
c/o had a C Section on 10/29/21 and is here for f/u because the right side of her incision is bleeding since yesterday, called her OB and was advised to put pressure it and it should stop. Told it was most likely the hematoma that was found on CT on Wednesdays @ [facility] ED. Patient states bleeding has decreased not she was given the option to return to the ED or follow up in the office.

## 2021-11-14 NOTE — ED ADULT TRIAGE NOTE - CHIEF COMPLAINT QUOTE
" I had C/S 10/29 , my suture line is draining brown fluid and I have small vaginal spotting today, I had a Ct Scan 5 days after delivery and they said have a hematoma by the suture line "

## 2021-11-14 NOTE — ED PROVIDER NOTE - ATTENDING CONTRIBUTION TO CARE
31y.o. F s/p  about 2wks ago, on abx for uti, had bleeding from incision, pt was known to have hematoma right lower abd wall in connection with surgery, pt does note that this area appears improved since the bleeding happened, and notes the bleeding has slowed tonight, has appt with her ob tomorrow; on exam pt is wd, wn, nad; skin - right side surgical scar (low transverse) 1cm area dehiscence, minimal dark blood at site, none further expressed, no erythema; A/P - wound dehiscence with expulsion of hematoma, pt reassured, will f/u with her ob this week

## 2021-11-14 NOTE — ED PROVIDER NOTE - OBJECTIVE STATEMENT
32 y/o F with PMHx of RA and anxiety presents with bleeding from C section incision site starting last night Pt is . Had section on 10/29, 5 days later started having abd pain. Seen at [facility] ER. had CT scan showing 4.5 cm hematoma to right lower abd. Recommended rest and antiinflammatories. Seen by OB on 11/10 for f/u and everything was going well for pt. Last night, noticed small amount of brown blood from incision. Called her doctor and told to put pressure on it. Bleeding continued today so called OB back, regular OB was not on call. Pt spoke with on call doctor and was told to either be seen tomorrow by her regular OB or to report to the ER. Pt did not want to wait so reported to the ER. Denies pain, n/v, fever. pt also started on Cephalexin on 11/10 due to bacteria in Urine. Denies urinary complaints at this time. Pt does not smoke. OB: West Lopez 30 y/o F with PMHx of RA and anxiety presents with bleeding from C section incision site starting last night. Pt is . Had c- section on 10/29, 5 days later started having epigastric  and RLQ abd pain. Seen at [facility] ER. had CT scan showing 4.5 cm hematoma to right lower abd. Recommended rest and antiinflammatories. Seen by OB on 11/10 for f/u and "everything was fine".  Last night, noticed small amount of brown blood from incision. Called her doctor and told to put pressure on it. Bleeding continued today (but much less per patient) so called OB back, states he regular OB was not on call. Pt spoke with on call doctor and was told to either be seen tomorrow by her regular OB or to report to the ER. Pt did not want to wait so reported to the ER. Denies pain, n/v, fever. pt also started on Cephalexin on 11/10 due to bacteria in Urine. Denies urinary complaints at this time. Pt does not smoke. OB: West Lopez

## 2021-11-14 NOTE — ED ADULT NURSE NOTE - BREATH SOUNDS, MLM
Clear
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 \par \par Since last visit, she states she has been doing "much better." She has not experienced syncope, and admits that her palpitations are less frequent.

## 2021-11-14 NOTE — ED ADULT NURSE REASSESSMENT NOTE - NS ED NURSE REASSESS COMMENT FT1
192- Well healing  surgical, Approx. 1 cm area of slight dehiscence to right side with a small amount of dried blood surrounding incision.  No active bleeding.

## 2021-11-19 ENCOUNTER — EMERGENCY (EMERGENCY)
Facility: HOSPITAL | Age: 31
LOS: 1 days | Discharge: HOME | End: 2021-11-19
Attending: EMERGENCY MEDICINE
Payer: COMMERCIAL

## 2021-11-19 VITALS
HEIGHT: 62 IN | SYSTOLIC BLOOD PRESSURE: 115 MMHG | OXYGEN SATURATION: 99 % | TEMPERATURE: 99 F | WEIGHT: 169.98 LBS | RESPIRATION RATE: 18 BRPM | DIASTOLIC BLOOD PRESSURE: 65 MMHG | HEART RATE: 94 BPM

## 2021-11-19 VITALS
RESPIRATION RATE: 18 BRPM | TEMPERATURE: 99 F | SYSTOLIC BLOOD PRESSURE: 120 MMHG | HEART RATE: 90 BPM | DIASTOLIC BLOOD PRESSURE: 78 MMHG | OXYGEN SATURATION: 99 %

## 2021-11-19 DIAGNOSIS — Z98.890 OTHER SPECIFIED POSTPROCEDURAL STATES: Chronic | ICD-10-CM

## 2021-11-19 DIAGNOSIS — Z98.891 HISTORY OF UTERINE SCAR FROM PREVIOUS SURGERY: Chronic | ICD-10-CM

## 2021-11-19 LAB
ALBUMIN SERPL ELPH-MCNC: 4.3 G/DL — SIGNIFICANT CHANGE UP (ref 3.3–5.2)
ALP SERPL-CCNC: 107 U/L — SIGNIFICANT CHANGE UP (ref 40–120)
ALT FLD-CCNC: 22 U/L — SIGNIFICANT CHANGE UP
ANION GAP SERPL CALC-SCNC: 15 MMOL/L — SIGNIFICANT CHANGE UP (ref 5–17)
APPEARANCE UR: ABNORMAL
AST SERPL-CCNC: 30 U/L — SIGNIFICANT CHANGE UP
BACTERIA # UR AUTO: ABNORMAL
BASOPHILS # BLD AUTO: 0.05 K/UL — SIGNIFICANT CHANGE UP (ref 0–0.2)
BASOPHILS NFR BLD AUTO: 0.5 % — SIGNIFICANT CHANGE UP (ref 0–2)
BILIRUB SERPL-MCNC: <0.2 MG/DL — LOW (ref 0.4–2)
BILIRUB UR-MCNC: NEGATIVE — SIGNIFICANT CHANGE UP
BLD GP AB SCN SERPL QL: SIGNIFICANT CHANGE UP
BUN SERPL-MCNC: 19.8 MG/DL — SIGNIFICANT CHANGE UP (ref 8–20)
CALCIUM SERPL-MCNC: 9.5 MG/DL — SIGNIFICANT CHANGE UP (ref 8.6–10.2)
CHLORIDE SERPL-SCNC: 105 MMOL/L — SIGNIFICANT CHANGE UP (ref 98–107)
CO2 SERPL-SCNC: 19 MMOL/L — LOW (ref 22–29)
COLOR SPEC: ABNORMAL
CREAT SERPL-MCNC: 0.96 MG/DL — SIGNIFICANT CHANGE UP (ref 0.5–1.3)
DIFF PNL FLD: ABNORMAL
EOSINOPHIL # BLD AUTO: 0.06 K/UL — SIGNIFICANT CHANGE UP (ref 0–0.5)
EOSINOPHIL NFR BLD AUTO: 0.6 % — SIGNIFICANT CHANGE UP (ref 0–6)
EPI CELLS # UR: SIGNIFICANT CHANGE UP
GLUCOSE SERPL-MCNC: 89 MG/DL — SIGNIFICANT CHANGE UP (ref 70–99)
GLUCOSE UR QL: NEGATIVE MG/DL — SIGNIFICANT CHANGE UP
HCG SERPL-ACNC: <4 MIU/ML — SIGNIFICANT CHANGE UP
HCT VFR BLD CALC: 42.9 % — SIGNIFICANT CHANGE UP (ref 34.5–45)
HGB BLD-MCNC: 14 G/DL — SIGNIFICANT CHANGE UP (ref 11.5–15.5)
IMM GRANULOCYTES NFR BLD AUTO: 0.2 % — SIGNIFICANT CHANGE UP (ref 0–1.5)
KETONES UR-MCNC: NEGATIVE — SIGNIFICANT CHANGE UP
LEUKOCYTE ESTERASE UR-ACNC: ABNORMAL
LYMPHOCYTES # BLD AUTO: 1.96 K/UL — SIGNIFICANT CHANGE UP (ref 1–3.3)
LYMPHOCYTES # BLD AUTO: 19.9 % — SIGNIFICANT CHANGE UP (ref 13–44)
MCHC RBC-ENTMCNC: 28.3 PG — SIGNIFICANT CHANGE UP (ref 27–34)
MCHC RBC-ENTMCNC: 32.6 GM/DL — SIGNIFICANT CHANGE UP (ref 32–36)
MCV RBC AUTO: 86.7 FL — SIGNIFICANT CHANGE UP (ref 80–100)
MONOCYTES # BLD AUTO: 0.56 K/UL — SIGNIFICANT CHANGE UP (ref 0–0.9)
MONOCYTES NFR BLD AUTO: 5.7 % — SIGNIFICANT CHANGE UP (ref 2–14)
NEUTROPHILS # BLD AUTO: 7.2 K/UL — SIGNIFICANT CHANGE UP (ref 1.8–7.4)
NEUTROPHILS NFR BLD AUTO: 73.1 % — SIGNIFICANT CHANGE UP (ref 43–77)
NITRITE UR-MCNC: NEGATIVE — SIGNIFICANT CHANGE UP
PH UR: 6.5 — SIGNIFICANT CHANGE UP (ref 5–8)
PLATELET # BLD AUTO: 414 K/UL — HIGH (ref 150–400)
POTASSIUM SERPL-MCNC: 3.9 MMOL/L — SIGNIFICANT CHANGE UP (ref 3.5–5.3)
POTASSIUM SERPL-SCNC: 3.9 MMOL/L — SIGNIFICANT CHANGE UP (ref 3.5–5.3)
PROT SERPL-MCNC: 7.9 G/DL — SIGNIFICANT CHANGE UP (ref 6.6–8.7)
PROT UR-MCNC: 30 MG/DL
RBC # BLD: 4.95 M/UL — SIGNIFICANT CHANGE UP (ref 3.8–5.2)
RBC # FLD: 12 % — SIGNIFICANT CHANGE UP (ref 10.3–14.5)
RBC CASTS # UR COMP ASSIST: >50 /HPF (ref 0–4)
SODIUM SERPL-SCNC: 139 MMOL/L — SIGNIFICANT CHANGE UP (ref 135–145)
SP GR SPEC: 1.01 — SIGNIFICANT CHANGE UP (ref 1.01–1.02)
UROBILINOGEN FLD QL: NEGATIVE MG/DL — SIGNIFICANT CHANGE UP
WBC # BLD: 9.85 K/UL — SIGNIFICANT CHANGE UP (ref 3.8–10.5)
WBC # FLD AUTO: 9.85 K/UL — SIGNIFICANT CHANGE UP (ref 3.8–10.5)
WBC UR QL: SIGNIFICANT CHANGE UP

## 2021-11-19 PROCEDURE — 86901 BLOOD TYPING SEROLOGIC RH(D): CPT

## 2021-11-19 PROCEDURE — 86850 RBC ANTIBODY SCREEN: CPT

## 2021-11-19 PROCEDURE — 81001 URINALYSIS AUTO W/SCOPE: CPT

## 2021-11-19 PROCEDURE — 74177 CT ABD & PELVIS W/CONTRAST: CPT | Mod: 26,MA

## 2021-11-19 PROCEDURE — 99284 EMERGENCY DEPT VISIT MOD MDM: CPT | Mod: 25

## 2021-11-19 PROCEDURE — 87086 URINE CULTURE/COLONY COUNT: CPT

## 2021-11-19 PROCEDURE — 86900 BLOOD TYPING SEROLOGIC ABO: CPT

## 2021-11-19 PROCEDURE — 99285 EMERGENCY DEPT VISIT HI MDM: CPT

## 2021-11-19 PROCEDURE — 80053 COMPREHEN METABOLIC PANEL: CPT

## 2021-11-19 PROCEDURE — 84702 CHORIONIC GONADOTROPIN TEST: CPT

## 2021-11-19 PROCEDURE — 74177 CT ABD & PELVIS W/CONTRAST: CPT | Mod: MA

## 2021-11-19 PROCEDURE — 36415 COLL VENOUS BLD VENIPUNCTURE: CPT

## 2021-11-19 PROCEDURE — 85025 COMPLETE CBC W/AUTO DIFF WBC: CPT

## 2021-11-19 NOTE — ED ADULT TRIAGE NOTE - CHIEF COMPLAINT QUOTE
3 weeks ago with hemorrhage and hematoma to the site of incision.  has been having brown, malodorous discharge from incision since.  now with right lower back pain, passing large clots.

## 2021-11-19 NOTE — ED STATDOCS - OBJECTIVE STATEMENT
32 y/o F  (last C/S was 12 years ago) s/p C/S on 10/29 at [facility] with superficial bleeding after the C/S that she attributes to pulling herself up too quickly on the bed hours after the surgery. She was discharged and went home, had increasing pain over the next few days to the right flank, went back to [facility] and had an MRI and was told she had a large hematoma, was discharged, followed up with her OB. She was doing well until a few days ago when she started having brownish discharge from the right side of her incision. Then she had lower abdominal cramping, went to the bathroom and passed a large amount of dark brown blood with large black clot (she shows a picture), went back to the bathroom a short while later, the same thing happened with another large black clot, and since then has been bleeding at a normal period volume, but bright red. On 11/10 she was prescribed Keflex for a UTI, and completed the entire course. She also describes suprapubic pain with urge to urinate, but nothing is coming out. She denies fevers, chills, nausea, vomiting, HA, dizziness, lightheadedness. She is having cramping in the lower abdomen that feels like uterine contractions while breast feeding, but not otherwise.

## 2021-11-19 NOTE — ED STATDOCS - PATIENT PORTAL LINK FT
You can access the FollowMyHealth Patient Portal offered by [facility] by registering at the following website: [email or web address]. By joining [employer]’s FollowMyHealth portal, you will also be able to view your health information using other applications (apps) compatible with our system.

## 2021-11-19 NOTE — ED STATDOCS - NS ED ROS FT
Const: Denies fever, chills  HEENT: Denies blurry vision, sore throat  Neck: Denies neck pain/stiffness  Resp: Denies coughing, SOB  Cardiovascular: Denies CP, palpitations, LE edema  GI: + abdominal pain. Denies nausea, vomiting, diarrhea, constipation, blood in stool  : + urgency, + vaginal bleeding, + right flank pain. Denies urgency/dysuria, hematuria  MSK: + back pain  Neuro: Denies HA, dizziness, numbness, weakness  Skin: Denies rashes.

## 2021-11-19 NOTE — ED STATDOCS - CLINICAL SUMMARY MEDICAL DECISION MAKING FREE TEXT BOX
32 y/o F 3 weeks post-op from C/S presents for vaginal bleeding, right flank pain, with h/o hematoma. Will check labs, CT A/P, UA. Patient will need to pump. I discussed with her no indication to pump and dump.

## 2021-11-19 NOTE — ED STATDOCS - PHYSICAL EXAMINATION
Const: Awake, alert and oriented. In no acute distress. Well appearing.  HEENT: NC/AT. Moist mucous membranes.  Eyes: No scleral icterus. EOMI.  Neck:. Soft and supple. Full ROM without pain.  Cardiac: Regular rate and regular rhythm. +S1/S2. No murmurs. Peripheral pulses 2+ and symmetric. No LE edema.  Resp: Speaking in full sentences. No evidence of respiratory distress. No wheezes, rales or rhonchi.  Abd: Soft, non-tender, non-distended. Normal bowel sounds in all 4 quadrants. No guarding or rebound.  Back: Spine midline and non-tender. No CVAT.  Skin: Well healing suprapubic scar without induration, drainage, erythema. No rashes, abrasions or lacerations.  Neuro: Awake, alert & oriented x 3. Moves all extremities symmetrically.

## 2021-11-21 LAB
CULTURE RESULTS: SIGNIFICANT CHANGE UP
SPECIMEN SOURCE: SIGNIFICANT CHANGE UP

## 2021-11-24 ENCOUNTER — APPOINTMENT (OUTPATIENT)
Dept: OBGYN | Facility: CLINIC | Age: 31
End: 2021-11-24
Payer: COMMERCIAL

## 2021-11-24 PROCEDURE — 0503F POSTPARTUM CARE VISIT: CPT

## 2021-11-24 NOTE — PLAN
[FreeTextEntry1] : 30 y/o F presenting for incision check\par -Incision healing well\par -Reviewed incision cleaning procedure with patient. Advised her to use dove soap and make sure incision is patted dry after to help control odor.\par -f/u for 6 week PP visit\par

## 2021-11-24 NOTE — HISTORY OF PRESENT ILLNESS
[FreeTextEntry1] : 30 y/o F s/p C/S on 10/29/21p resenting for incision check. Liveborn male. She is breastfeeding and bottle feeding. Pt c/o her incision smelling x 1 week. Pt denies any fever. Patient denies redness, pain or drainage from the incision site. \par \par

## 2021-11-24 NOTE — PHYSICAL EXAM
[Soft] : soft [Non-tender] : non-tender [Non-distended] : non-distended [No HSM] : No HSM [No Lesions] : no lesions [No Mass] : no mass [FreeTextEntry7] : incision intact and well approximated. Mild erythema noted. No drianage.

## 2021-11-29 ENCOUNTER — APPOINTMENT (OUTPATIENT)
Dept: OBGYN | Facility: CLINIC | Age: 31
End: 2021-11-29
Payer: COMMERCIAL

## 2021-11-29 VITALS — DIASTOLIC BLOOD PRESSURE: 73 MMHG | SYSTOLIC BLOOD PRESSURE: 112 MMHG | BODY MASS INDEX: 31.64 KG/M2 | WEIGHT: 173 LBS

## 2021-11-29 PROCEDURE — 99213 OFFICE O/P EST LOW 20 MIN: CPT

## 2021-11-29 NOTE — HISTORY OF PRESENT ILLNESS
[Postpartum Follow Up] : postpartum follow up [Complications:___] : complications include: [unfilled] [Delivery Date: ___] : on [unfilled] [Repeat C/S] : delivered by  section (repeat) [Breastfeeding] : currently nursing [Incisional Drainage] : incisional drainage [Clean/Dry/Intact] : clean, dry and intact [Intact] : was intact [Doing Well] : is doing well [No Sign of Infection] : is showing no signs of infection [de-identified] : small area on left side of incision stypt stick used , no s/s of infection  [FreeTextEntry3] : small area on left hand side open, stypt stick used.  [de-identified] : 4 weeks for PP visit

## 2021-12-22 ENCOUNTER — APPOINTMENT (OUTPATIENT)
Dept: OBGYN | Facility: CLINIC | Age: 31
End: 2021-12-22
Payer: COMMERCIAL

## 2021-12-22 VITALS — BODY MASS INDEX: 32.19 KG/M2 | DIASTOLIC BLOOD PRESSURE: 77 MMHG | SYSTOLIC BLOOD PRESSURE: 127 MMHG | WEIGHT: 176 LBS

## 2021-12-22 DIAGNOSIS — Z87.898 PERSONAL HISTORY OF OTHER SPECIFIED CONDITIONS: ICD-10-CM

## 2021-12-22 DIAGNOSIS — R39.9 UNSPECIFIED SYMPTOMS AND SIGNS INVOLVING THE GENITOURINARY SYSTEM: ICD-10-CM

## 2021-12-22 DIAGNOSIS — R76.8 OTHER SPECIFIED ABNORMAL IMMUNOLOGICAL FINDINGS IN SERUM: ICD-10-CM

## 2021-12-22 DIAGNOSIS — Z87.42 PERSONAL HISTORY OF OTHER DISEASES OF THE FEMALE GENITAL TRACT: ICD-10-CM

## 2021-12-22 DIAGNOSIS — R92.8 OTHER ABNORMAL AND INCONCLUSIVE FINDINGS ON DIAGNOSTIC IMAGING OF BREAST: ICD-10-CM

## 2021-12-22 PROCEDURE — 0503F POSTPARTUM CARE VISIT: CPT

## 2021-12-22 NOTE — HISTORY OF PRESENT ILLNESS
[FreeTextEntry1] : 32 y/o F s/p C/S on 10/29/21 for repeat presenting for postpartum visit. Liveborn male. She is breastfeeding and bottle feeding.\par \par

## 2021-12-22 NOTE — PHYSICAL EXAM
[Chaperone Present] : A chaperone was present in the examining room during all aspects of the physical examination [FreeTextEntry1] : Marcelina [Appropriately responsive] : appropriately responsive [Alert] : alert [No Acute Distress] : no acute distress [Soft] : soft [Non-tender] : non-tender [Non-distended] : non-distended [No HSM] : No HSM [No Lesions] : no lesions [No Mass] : no mass [FreeTextEntry7] : incision intact and well approximated. No erythema or drainage noted [Examination Of The Breasts] : a normal appearance [No Masses] : no breast masses were palpable [Labia Majora] : normal [Labia Minora] : normal [Normal] : normal [Uterine Adnexae] : normal

## 2021-12-22 NOTE — PLAN
[FreeTextEntry1] : 30 y/o F s/p C/S presenting for postpartum visit \par -incision healing well\par -Patient cleared to resume normal activity (sexual intercourse and exercise)\par -Patient counseled on contraception options, desires Carol. RX sent to pharmacy\par -f/u for 3 months for annual\par

## 2022-02-02 ENCOUNTER — APPOINTMENT (OUTPATIENT)
Dept: ANTEPARTUM | Facility: CLINIC | Age: 32
End: 2022-02-02
Payer: COMMERCIAL

## 2022-02-02 ENCOUNTER — APPOINTMENT (OUTPATIENT)
Dept: OBGYN | Facility: CLINIC | Age: 32
End: 2022-02-02
Payer: COMMERCIAL

## 2022-02-02 VITALS
WEIGHT: 173 LBS | HEIGHT: 62 IN | BODY MASS INDEX: 31.83 KG/M2 | DIASTOLIC BLOOD PRESSURE: 69 MMHG | SYSTOLIC BLOOD PRESSURE: 101 MMHG

## 2022-02-02 DIAGNOSIS — N92.0 EXCESSIVE AND FREQUENT MENSTRUATION WITH REGULAR CYCLE: ICD-10-CM

## 2022-02-02 PROBLEM — M06.9 RHEUMATOID ARTHRITIS, UNSPECIFIED: Chronic | Status: ACTIVE | Noted: 2021-11-19

## 2022-02-02 PROCEDURE — 76830 TRANSVAGINAL US NON-OB: CPT

## 2022-02-02 PROCEDURE — 76856 US EXAM PELVIC COMPLETE: CPT | Mod: 59

## 2022-02-02 PROCEDURE — ZZZZZ: CPT

## 2022-02-07 PROBLEM — N92.0 HEAVY PERIODS: Status: ACTIVE | Noted: 2022-02-07

## 2022-02-07 NOTE — HISTORY OF PRESENT ILLNESS
[FreeTextEntry1] : 30 yo female presents today for gyn sono s/p c/s on 10/29/21. Patient reports an LMP of 1/28/22, which was a very heavy cycle as per patient.

## 2022-06-01 ENCOUNTER — APPOINTMENT (OUTPATIENT)
Dept: OBGYN | Facility: CLINIC | Age: 32
End: 2022-06-01
Payer: COMMERCIAL

## 2022-06-01 VITALS
WEIGHT: 173 LBS | DIASTOLIC BLOOD PRESSURE: 74 MMHG | SYSTOLIC BLOOD PRESSURE: 113 MMHG | HEIGHT: 62 IN | BODY MASS INDEX: 31.83 KG/M2

## 2022-06-01 DIAGNOSIS — Z78.9 OTHER SPECIFIED HEALTH STATUS: ICD-10-CM

## 2022-06-01 DIAGNOSIS — Z82.49 FAMILY HISTORY OF ISCHEMIC HEART DISEASE AND OTHER DISEASES OF THE CIRCULATORY SYSTEM: ICD-10-CM

## 2022-06-01 PROCEDURE — 99395 PREV VISIT EST AGE 18-39: CPT

## 2022-06-01 PROCEDURE — 36415 COLL VENOUS BLD VENIPUNCTURE: CPT

## 2022-06-01 RX ORDER — VALACYCLOVIR 1 G/1
1 TABLET, FILM COATED ORAL DAILY
Qty: 30 | Refills: 6 | Status: DISCONTINUED | COMMUNITY
Start: 2021-10-13 | End: 2022-06-01

## 2022-06-01 RX ORDER — CEPHALEXIN 500 MG/1
500 CAPSULE ORAL 3 TIMES DAILY
Qty: 21 | Refills: 0 | Status: DISCONTINUED | COMMUNITY
Start: 2021-11-10 | End: 2022-06-01

## 2022-06-01 RX ORDER — NORETHINDRONE 0.35 MG/1
0.35 TABLET ORAL
Qty: 1 | Refills: 3 | Status: DISCONTINUED | COMMUNITY
Start: 2021-12-22 | End: 2022-06-01

## 2022-06-01 RX ORDER — HYDROXYCHLOROQUINE SULFATE 200 MG/1
200 TABLET, FILM COATED ORAL
Qty: 180 | Refills: 0 | Status: ACTIVE | COMMUNITY
Start: 2022-05-23

## 2022-06-01 NOTE — PLAN
[FreeTextEntry1] : 32 y/o female presenting for annual exam:\par -f/u pap and GC/CT, and cbc\par -Contraception: Patient was on Carol but recently stopped. Now using condoms. Patient contemplating IUd- will call office for placement when she makes up her mind.\par -f/u PRN\par

## 2022-06-01 NOTE — PHYSICAL EXAM
[Chaperone Present] : A chaperone was present in the examining room during all aspects of the physical examination [FreeTextEntry1] : Bogdan [Appropriately responsive] : appropriately responsive [Alert] : alert [No Acute Distress] : no acute distress [Soft] : soft [Non-tender] : non-tender [Non-distended] : non-distended [No HSM] : No HSM [No Lesions] : no lesions [No Mass] : no mass [Oriented x3] : oriented x3 [Examination Of The Breasts] : a normal appearance [No Masses] : no breast masses were palpable [Labia Majora] : normal [Labia Minora] : normal [Normal] : normal [Uterine Adnexae] : normal

## 2022-06-01 NOTE — HISTORY OF PRESENT ILLNESS
[Y] : Positive pregnancy history [Regular Cycle Intervals] : periods have been regular [Currently Active] : currently active [Men] : men [Vaginal] : vaginal [No] : No [Condoms] : Condoms [Patient would like to be screened for STIs] : Patient would like to be screened for STIs [LMPDate] : 5/20/22 [PGxTotal] : 1 [[facility] : 2 [PGHxPremature] : 0 [PGHxAbortions] : 0 [[facility]Living] : 2 [PGHxABInduced] : 0 [PGHxABSpont] : 1 [PGHxEctopic] : 0 [PGHxMultBirths] : 0 [FreeTextEntry1] : 5/20/22

## 2022-06-02 LAB
BASOPHILS # BLD AUTO: 0.09 K/UL
BASOPHILS NFR BLD AUTO: 1.3 %
EOSINOPHIL # BLD AUTO: 0.07 K/UL
EOSINOPHIL NFR BLD AUTO: 1 %
HCT VFR BLD CALC: 43.8 %
HGB BLD-MCNC: 13.5 G/DL
HPV HIGH+LOW RISK DNA PNL CVX: NOT DETECTED
IMM GRANULOCYTES NFR BLD AUTO: 0.1 %
LYMPHOCYTES # BLD AUTO: 2.41 K/UL
LYMPHOCYTES NFR BLD AUTO: 35.4 %
MAN DIFF?: NORMAL
MCHC RBC-ENTMCNC: 27.8 PG
MCHC RBC-ENTMCNC: 30.8 GM/DL
MCV RBC AUTO: 90.1 FL
MONOCYTES # BLD AUTO: 0.59 K/UL
MONOCYTES NFR BLD AUTO: 8.7 %
NEUTROPHILS # BLD AUTO: 3.64 K/UL
NEUTROPHILS NFR BLD AUTO: 53.5 %
PLATELET # BLD AUTO: 349 K/UL
RBC # BLD: 4.86 M/UL
RBC # FLD: 14.7 %
WBC # FLD AUTO: 6.81 K/UL

## 2022-06-05 LAB
C TRACH RRNA SPEC QL NAA+PROBE: NOT DETECTED
N GONORRHOEA RRNA SPEC QL NAA+PROBE: NOT DETECTED
SOURCE AMPLIFICATION: NORMAL

## 2022-09-27 NOTE — OB PST NOTE - ACTIVITY
Skin care plans:  1-Hydraguard to bilateral sacrum, buttock and heels BID and PRN  2-Elevate heels to offload pressure  3-Ehob cushion in chair when out of bed  4-Moisturize skin daily with skin nourishing cream   5-Turn/reposition q2h or when medically stable for pressure re-distribution on skin 
does ADL and some house chores

## 2022-09-29 NOTE — OB RN TRIAGE NOTE - NSOBDISCHARGEVS_OBGYN_ALL_OB
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;intercostal neuralgia&quot;)      Cardiac abnormality  (pt states hx/o &quot;cardiac event&quot;; is unclear on diagnosis; denies hx/o MI)      Diabetes      Essential hypertension      IBS (irritable bowel syndrome)      Complex regional pain syndrome type 1      History of cholecystectomy      History of hand surgery  (pt states she had 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10)          FAMILY HISTORY:  FH: HTN (hypertension)        Home Medications:  benzonatate 100 mg oral capsule: 1 cap(s) orally 3 times a day (22 Jul 2022 16:48)  furosemide 40 mg oral tablet: 1 tab(s) orally once a day (22 Jul 2022 16:48)  guaiFENesin 1200 mg oral tablet, extended release: 1 tab(s) orally every 12 hours (22 Jul 2022 16:48)  lisinopril 10 mg oral tablet: 1 tab(s) orally once a day (22 Jul 2022 16:48)  mupirocin 2% topical ointment: 1 application topically 2 times a day (22 Jul 2022 16:48)  naloxegol 25 mg oral tablet: 1 tab(s) orally once a day (22 Jul 2022 16:48)  NIFEdipine 60 mg oral tablet, extended release: 1 tab(s) orally once a day (22 Jul 2022 16:48)  NovoLOG 100 units/mL injectable solution: 10 unit(s) subcutaneous 3 times a day (with meals) (11 Jul 2022 01:34)  oxyCODONE 15 mg oral tablet: 1 tab(s) orally every 4 hours, As needed, Severe Pain (7 - 10) (22 Jul 2022 16:48)  pantoprazole 40 mg oral delayed release tablet: 1 tab(s) orally once a day (before a meal) (22 Jul 2022 16:48)  pregabalin 100 mg oral capsule: 1 cap(s) orally every 8 hours (22 Jul 2022 16:48)  sertraline 50 mg oral tablet: 1 tab(s) orally once a day (22 Jul 2022 16:48)  Tresiba 100 units/mL subcutaneous solution: 80 unit(s) subcutaneous every 72 hours (11 Jul 2022 01:22)      MEDICATIONS  (STANDING):  buMETAnide Injectable 2 milliGRAM(s) IV Push two times a day  dextrose 5%. 1000 milliLiter(s) (50 mL/Hr) IV Continuous <Continuous>  dextrose 5%. 1000 milliLiter(s) (100 mL/Hr) IV Continuous <Continuous>  dextrose 50% Injectable 25 Gram(s) IV Push once  dextrose 50% Injectable 25 Gram(s) IV Push once  dextrose 50% Injectable 12.5 Gram(s) IV Push once  glucagon  Injectable 1 milliGRAM(s) IntraMuscular once  heparin   Injectable 7500 Unit(s) SubCutaneous every 12 hours  influenza   Vaccine 0.5 milliLiter(s) IntraMuscular once  insulin glargine Injectable (LANTUS) 20 Unit(s) SubCutaneous at bedtime  insulin lispro (ADMELOG) corrective regimen sliding scale   SubCutaneous three times a day before meals  insulin lispro (ADMELOG) corrective regimen sliding scale   SubCutaneous at bedtime  insulin lispro Injectable (ADMELOG) 4 Unit(s) SubCutaneous three times a day before meals  lisinopril 10 milliGRAM(s) Oral daily  NIFEdipine XL 60 milliGRAM(s) Oral daily  pantoprazole    Tablet 40 milliGRAM(s) Oral before breakfast  pregabalin 100 milliGRAM(s) Oral every 8 hours  senna 2 Tablet(s) Oral at bedtime  sertraline 50 milliGRAM(s) Oral daily  spironolactone 25 milliGRAM(s) Oral daily  tiotropium 18 MICROgram(s) Capsule 1 Capsule(s) Inhalation daily    MEDICATIONS  (PRN):  baclofen 5 milliGRAM(s) Oral every 8 hours PRN Muscle Spasm  dextrose Oral Gel 15 Gram(s) Oral once PRN Blood Glucose LESS THAN 70 milliGRAM(s)/deciliter  melatonin 3 milliGRAM(s) Oral at bedtime PRN Sleep  ondansetron Injectable 4 milliGRAM(s) IV Push every 8 hours PRN Nausea and/or Vomiting  oxyCODONE    IR 30 milliGRAM(s) Oral every 4 hours PRN Severe Pain (7 - 10)  oxyCODONE    IR 15 milliGRAM(s) Oral every 4 hours PRN Moderate Pain (4 - 6)  simethicone 80 milliGRAM(s) Chew three times a day PRN Gas      Diet, Consistent Carbohydrate w/Evening Snack:   1200mL Fluid Restriction ([id number])  Low Sodium  Liquid Protein Supplement     Qty per Day:  1 (09-27-22 @ 12:07) [Active]          Vital Signs Last 24 Hrs  T(C): 37.1 (29 Sep 2022 05:47), Max: 37.1 (29 Sep 2022 05:47)  T(F): 98.8 (29 Sep 2022 05:47), Max: 98.8 (29 Sep 2022 05:47)  HR: 68 (29 Sep 2022 08:39) (62 - 80)  BP: 117/66 (29 Sep 2022 05:47) (104/68 - 162/88)  BP(mean): --  RR: 18 (29 Sep 2022 05:47) (18 - 18)  SpO2: 98% (29 Sep 2022 08:39) (93% - 100%)    Parameters below as of 29 Sep 2022 05:47  Patient On (Oxygen Delivery Method): nasal cannula          09-28-22 @ 07:01  -  09-29-22 @ 07:00  --------------------------------------------------------  IN: 600 mL / OUT: 1900 mL / NET: -1300 mL    09-29-22 @ 07:01  -  09-29-22 @ 10:11  --------------------------------------------------------  IN: 0 mL / OUT: 700 mL / NET: -700 mL              LABS:                        10.1   9.22  )-----------( 404      ( 29 Sep 2022 06:27 )             33.1     09-29    138  |  100  |  15  ----------------------------<  293<H>  3.7   |  32<H>  |  1.03    Ca    8.6      29 Sep 2022 06:27  Mg     1.6     09-29    TPro  7.4  /  Alb  2.9<L>  /  TBili  0.4  /  DBili  x   /  AST  22  /  ALT  30  /  AlkPhos  89  09-29              WBC:  WBC Count: 9.22 K/uL (09-29 @ 06:27)  WBC Count: 8.54 K/uL (09-26 @ 08:13)      MICROBIOLOGY:  RECENT CULTURES:  09-23 .Blood Blood XXXX XXXX   No Growth Final                    Sodium:  Sodium, Serum: 138 mmol/L (09-29 @ 06:27)  Sodium, Serum: 137 mmol/L (09-27 @ 06:21)  Sodium, Serum: 139 mmol/L (09-26 @ 08:13)      1.03 mg/dL 09-29 @ 06:27  0.90 mg/dL 09-27 @ 06:21  0.83 mg/dL 09-26 @ 08:13      Hemoglobin:  Hemoglobin: 10.1 g/dL (09-29 @ 06:27)  Hemoglobin: 9.9 g/dL (09-26 @ 08:13)      Platelets: Platelet Count - Automated: 404 K/uL (09-29 @ 06:27)  Platelet Count - Automated: 419 K/uL (09-26 @ 08:13)      LIVER FUNCTIONS - ( 29 Sep 2022 06:27 )  Alb: 2.9 g/dL / Pro: 7.4 gm/dL / ALK PHOS: 89 U/L / ALT: 30 U/L / AST: 22 U/L / GGT: x                 RADIOLOGY & ADDITIONAL STUDIES:      MICROBIOLOGY:  RECENT CULTURES:  09-23 .Blood Blood XXXX XXXX   No Growth Final             Confirmed that patient received an additional set of vital signs prior to discharge.

## 2022-10-27 NOTE — OB PROVIDER TRIAGE NOTE - NS_FETALMONCTXRES_OBGYN_ALL_OB
Relaxed Minoxidil Counseling: Minoxidil is a topical medication which can increase blood flow where it is applied. It is uncertain how this medication increases hair growth. Side effects are uncommon and include stinging and allergic reactions.

## 2022-11-09 NOTE — DISCHARGE NOTE OB - YES, WALK AS TOLERATED
Patient is informed of results of pelvic ultrasound done yesterday.  Uterus is anteverted and normal.  Endometrium is 15 mm (LMP 10/10/2022).  Bilateral ovaries are normal with vascular flow.  There is no free fluid or adnexal masses.  The patient is desiring to proceed with diagnostic laparoscopy for further evaluation of chronic pelvic pain and dyspareunia.  Case request is placed.  
Statement Selected

## 2022-11-17 NOTE — PLAN
-hg >10, no additional epo needed at this time   [FreeTextEntry1] : 32 yo female presents today for a gyn sono for heavy periods:\par - gyn sono WNL- see official sono report\par -Patient delivere via c/s on 10/29/21. Advised patient that the first few cycles post delivery may be heavier than usual.\par -f/u for annual exam/prn

## 2023-04-17 ENCOUNTER — APPOINTMENT (OUTPATIENT)
Dept: OBGYN | Facility: CLINIC | Age: 33
End: 2023-04-17
Payer: MEDICAID

## 2023-04-17 VITALS
DIASTOLIC BLOOD PRESSURE: 72 MMHG | SYSTOLIC BLOOD PRESSURE: 120 MMHG | WEIGHT: 171 LBS | BODY MASS INDEX: 31.47 KG/M2 | HEIGHT: 62 IN

## 2023-04-17 DIAGNOSIS — Z01.419 ENCOUNTER FOR GYNECOLOGICAL EXAMINATION (GENERAL) (ROUTINE) W/OUT ABNORMAL FINDINGS: ICD-10-CM

## 2023-04-17 DIAGNOSIS — N91.2 AMENORRHEA, UNSPECIFIED: ICD-10-CM

## 2023-04-17 PROCEDURE — 99395 PREV VISIT EST AGE 18-39: CPT

## 2023-04-17 PROCEDURE — 99213 OFFICE O/P EST LOW 20 MIN: CPT | Mod: 25

## 2023-04-17 NOTE — HISTORY OF PRESENT ILLNESS
[N] : Patient does not use contraception [Monogamous (Male Partner)] : is monogamous with a male partner [Y] : Positive pregnancy history [MensesFreq] : 30 [LMPDate] : 03/09/2023 [MensesLength] : 6 [PGHxTotal] : 4 [[facility] : 2 [PGHxPremature] : 0 [PGHxAbortions] : 1 [[facility]] : 2 [FreeTextEntry1] : c/s x 2  [Yes] : pregnancy [TextBox_6] : 03/09/2023 [Currently Active] : currently active [Men] : men [Vaginal] : vaginal [No] : No

## 2023-04-17 NOTE — PHYSICAL EXAM
[Chaperone Present] : A chaperone was present in the examining room during all aspects of the physical examination [FreeTextEntry1] : Rod [Appropriately responsive] : appropriately responsive [Alert] : alert [No Acute Distress] : no acute distress [No Lymphadenopathy] : no lymphadenopathy [Soft] : soft [Non-tender] : non-tender [Non-distended] : non-distended [No HSM] : No HSM [No Lesions] : no lesions [No Mass] : no mass [Oriented x3] : oriented x3 [Examination Of The Breasts] : a normal appearance [No Masses] : no breast masses were palpable [Labia Majora] : normal [Labia Minora] : normal [Normal] : normal [Uterine Adnexae] : normal

## 2023-04-18 LAB
ABO + RH PNL BLD: NORMAL
APPEARANCE: CLEAR
BACTERIA: NEGATIVE
BASOPHILS # BLD AUTO: 0.05 K/UL
BASOPHILS NFR BLD AUTO: 0.5 %
BILIRUBIN URINE: NEGATIVE
BLOOD URINE: NEGATIVE
C TRACH RRNA SPEC QL NAA+PROBE: NOT DETECTED
COLOR: COLORLESS
EOSINOPHIL # BLD AUTO: 0.03 K/UL
EOSINOPHIL NFR BLD AUTO: 0.3 %
ESTIMATED AVERAGE GLUCOSE: 97 MG/DL
GLUCOSE QUALITATIVE U: NEGATIVE
GLUCOSE SERPL-MCNC: 89 MG/DL
HBA1C MFR BLD HPLC: 5 %
HBV SURFACE AG SER QL: NONREACTIVE
HCT VFR BLD CALC: 40.3 %
HGB BLD-MCNC: 13.6 G/DL
HIV1+2 AB SPEC QL IA.RAPID: NONREACTIVE
HYALINE CASTS: 1 /LPF
IMM GRANULOCYTES NFR BLD AUTO: 0.3 %
KETONES URINE: NEGATIVE
LEUKOCYTE ESTERASE URINE: NEGATIVE
LYMPHOCYTES # BLD AUTO: 2.25 K/UL
LYMPHOCYTES NFR BLD AUTO: 23 %
MAN DIFF?: NORMAL
MCHC RBC-ENTMCNC: 30.2 PG
MCHC RBC-ENTMCNC: 33.7 GM/DL
MCV RBC AUTO: 89.4 FL
MICROSCOPIC-UA: NORMAL
MONOCYTES # BLD AUTO: 0.7 K/UL
MONOCYTES NFR BLD AUTO: 7.1 %
N GONORRHOEA RRNA SPEC QL NAA+PROBE: NOT DETECTED
NEUTROPHILS # BLD AUTO: 6.74 K/UL
NEUTROPHILS NFR BLD AUTO: 68.8 %
NITRITE URINE: NEGATIVE
PH URINE: 6.5
PLATELET # BLD AUTO: 331 K/UL
PROTEIN URINE: NEGATIVE
RBC # BLD: 4.51 M/UL
RBC # FLD: 13.7 %
RED BLOOD CELLS URINE: 1 /HPF
SOURCE AMPLIFICATION: NORMAL
SPECIFIC GRAVITY URINE: 1.01
SQUAMOUS EPITHELIAL CELLS: 2 /HPF
UROBILINOGEN URINE: NORMAL
WBC # FLD AUTO: 9.8 K/UL
WHITE BLOOD CELLS URINE: 0 /HPF

## 2023-04-19 LAB
LEAD BLD-MCNC: <1 UG/DL
MEV IGG FLD QL IA: >300 AU/ML
MEV IGG+IGM SER-IMP: POSITIVE
MUV AB SER-ACNC: POSITIVE
MUV IGG SER QL IA: 94.7 AU/ML
RUBV IGG FLD-ACNC: 15.9 INDEX
RUBV IGG SER-IMP: POSITIVE

## 2023-04-23 LAB — CYTOLOGY CVX/VAG DOC THIN PREP: NORMAL

## 2023-04-24 ENCOUNTER — NON-APPOINTMENT (OUTPATIENT)
Age: 33
End: 2023-04-24

## 2023-04-24 LAB
BACTERIA UR CULT: ABNORMAL
M TB IFN-G BLD-IMP: POSITIVE
QUANTIFERON TB PLUS MITOGEN MINUS NIL: 5.77 IU/ML
QUANTIFERON TB PLUS NIL: 0.25 IU/ML
QUANTIFERON TB PLUS TB1 MINUS NIL: 2.93 IU/ML
QUANTIFERON TB PLUS TB2 MINUS NIL: 3.41 IU/ML

## 2023-05-01 ENCOUNTER — APPOINTMENT (OUTPATIENT)
Dept: OBGYN | Facility: CLINIC | Age: 33
End: 2023-05-01
Payer: MEDICAID

## 2023-05-01 ENCOUNTER — APPOINTMENT (OUTPATIENT)
Dept: ANTEPARTUM | Facility: CLINIC | Age: 33
End: 2023-05-01
Payer: MEDICAID

## 2023-05-01 VITALS
SYSTOLIC BLOOD PRESSURE: 115 MMHG | DIASTOLIC BLOOD PRESSURE: 71 MMHG | BODY MASS INDEX: 32.39 KG/M2 | WEIGHT: 176 LBS | HEIGHT: 62 IN

## 2023-05-01 DIAGNOSIS — Z33.1 PREGNANT STATE, INCIDENTAL: ICD-10-CM

## 2023-05-01 PROCEDURE — ZZZZZ: CPT

## 2023-05-01 PROCEDURE — 76801 OB US < 14 WKS SINGLE FETUS: CPT

## 2023-05-02 NOTE — OB PST NOTE - DOES PATIENT HAVE ADVANCE DIRECTIVE
Strep test is positive today.  Treat with cefdinir twice daily for 10 days.  First dose of cefdinir in urgent care today.  Tylenol and ibuprofen as needed for fevers and discomfort.  Ensure continued hydration.  If symptoms persist or worsen return to urgent care.  
No

## 2023-05-03 LAB — BACTERIA UR CULT: NORMAL

## 2023-05-05 ENCOUNTER — NON-APPOINTMENT (OUTPATIENT)
Age: 33
End: 2023-05-05

## 2023-05-22 ENCOUNTER — APPOINTMENT (OUTPATIENT)
Dept: OBGYN | Facility: CLINIC | Age: 33
End: 2023-05-22
Payer: MEDICAID

## 2023-05-22 VITALS — DIASTOLIC BLOOD PRESSURE: 71 MMHG | SYSTOLIC BLOOD PRESSURE: 111 MMHG | BODY MASS INDEX: 32.56 KG/M2 | WEIGHT: 178 LBS

## 2023-05-22 PROCEDURE — 99212 OFFICE O/P EST SF 10 MIN: CPT | Mod: TH

## 2023-05-25 LAB
APPEARANCE: CLEAR
BACTERIA UR CULT: NORMAL
BACTERIA: NEGATIVE /HPF
BILIRUBIN URINE: NEGATIVE
BLOOD URINE: NEGATIVE
CAST: 0 /LPF
COLOR: YELLOW
EPITHELIAL CELLS: 4 /HPF
GLUCOSE QUALITATIVE U: NEGATIVE MG/DL
KETONES URINE: NEGATIVE MG/DL
LEUKOCYTE ESTERASE URINE: NEGATIVE
MICROSCOPIC-UA: NORMAL
NITRITE URINE: NEGATIVE
PH URINE: 6.5
PROTEIN URINE: NEGATIVE MG/DL
RED BLOOD CELLS URINE: 0 /HPF
SPECIFIC GRAVITY URINE: 1.01
UROBILINOGEN URINE: 0.2 MG/DL
WHITE BLOOD CELLS URINE: 0 /HPF

## 2023-05-27 ENCOUNTER — TRANSCRIPTION ENCOUNTER (OUTPATIENT)
Age: 33
End: 2023-05-27

## 2023-05-28 LAB
CHROMOSOME13 INTERPRETATION: NORMAL
CHROMOSOME13 TEST RESULT: NORMAL
CHROMOSOME18 INTERPRETATION: NORMAL
CHROMOSOME18 TEST RESULT: NORMAL
CHROMOSOME21 INTERPRETATION: NORMAL
CHROMOSOME21 TEST RESULT: NORMAL
FETAL FRACTION: NORMAL
PERFORMANCE AND LIMITATIONS: NORMAL
SEX CHROMOSOME INTERPRETATION: NORMAL
SEX CHROMOSOME TEST RESULT: NORMAL
VERIFI PRENATAL TEST: NOT DETECTED

## 2023-06-07 ENCOUNTER — APPOINTMENT (OUTPATIENT)
Dept: OBGYN | Facility: CLINIC | Age: 33
End: 2023-06-07
Payer: MEDICAID

## 2023-06-07 ENCOUNTER — APPOINTMENT (OUTPATIENT)
Dept: ANTEPARTUM | Facility: CLINIC | Age: 33
End: 2023-06-07
Payer: MEDICAID

## 2023-06-07 VITALS — SYSTOLIC BLOOD PRESSURE: 119 MMHG | DIASTOLIC BLOOD PRESSURE: 74 MMHG | BODY MASS INDEX: 33.11 KG/M2 | WEIGHT: 181 LBS

## 2023-06-07 PROCEDURE — 76813 OB US NUCHAL MEAS 1 GEST: CPT

## 2023-06-07 PROCEDURE — 99213 OFFICE O/P EST LOW 20 MIN: CPT | Mod: TH

## 2023-06-10 ENCOUNTER — EMERGENCY (EMERGENCY)
Facility: HOSPITAL | Age: 33
LOS: 1 days | Discharge: HOME | End: 2023-06-10
Attending: STUDENT IN AN ORGANIZED HEALTH CARE EDUCATION/TRAINING PROGRAM
Payer: COMMERCIAL

## 2023-06-10 VITALS
DIASTOLIC BLOOD PRESSURE: 74 MMHG | RESPIRATION RATE: 18 BRPM | HEART RATE: 89 BPM | OXYGEN SATURATION: 100 % | SYSTOLIC BLOOD PRESSURE: 117 MMHG | WEIGHT: 178.79 LBS | TEMPERATURE: 98 F

## 2023-06-10 DIAGNOSIS — Z98.891 HISTORY OF UTERINE SCAR FROM PREVIOUS SURGERY: Chronic | ICD-10-CM

## 2023-06-10 DIAGNOSIS — Z98.890 OTHER SPECIFIED POSTPROCEDURAL STATES: Chronic | ICD-10-CM

## 2023-06-10 LAB
ADDITIONAL US: NORMAL
ALBUMIN SERPL ELPH-MCNC: 3.9 G/DL — SIGNIFICANT CHANGE UP (ref 3.3–5.2)
ALP SERPL-CCNC: 54 U/L — SIGNIFICANT CHANGE UP (ref 40–120)
ALT FLD-CCNC: 16 U/L — SIGNIFICANT CHANGE UP
ANION GAP SERPL CALC-SCNC: 14 MMOL/L — SIGNIFICANT CHANGE UP (ref 5–17)
APPEARANCE UR: CLEAR — SIGNIFICANT CHANGE UP
AST SERPL-CCNC: 21 U/L — SIGNIFICANT CHANGE UP
BASOPHILS # BLD AUTO: 0.05 K/UL — SIGNIFICANT CHANGE UP (ref 0–0.2)
BASOPHILS NFR BLD AUTO: 0.6 % — SIGNIFICANT CHANGE UP (ref 0–2)
BILIRUB SERPL-MCNC: 0.2 MG/DL — LOW (ref 0.4–2)
BILIRUB UR-MCNC: NEGATIVE — SIGNIFICANT CHANGE UP
BUN SERPL-MCNC: 10.6 MG/DL — SIGNIFICANT CHANGE UP (ref 8–20)
CALCIUM SERPL-MCNC: 9 MG/DL — SIGNIFICANT CHANGE UP (ref 8.4–10.5)
CHLORIDE SERPL-SCNC: 102 MMOL/L — SIGNIFICANT CHANGE UP (ref 96–108)
CO2 SERPL-SCNC: 21 MMOL/L — LOW (ref 22–29)
COLOR SPEC: YELLOW — SIGNIFICANT CHANGE UP
CREAT SERPL-MCNC: 0.54 MG/DL — SIGNIFICANT CHANGE UP (ref 0.5–1.3)
CRL SCAN TWIN B: NORMAL
CRL SCAN: NORMAL
CROWN RUMP LENGTH TWIN B: NORMAL
CROWN RUMP LENGTH: 76.8 MM
DIA MOM: 1.17
DIA VALUE: 207.5 PG/ML
DIFF PNL FLD: NEGATIVE — SIGNIFICANT CHANGE UP
DOWN SYNDROME AGE RISK: NORMAL
DOWN SYNDROME INTERPRETATION: NORMAL
DOWN SYNDROME SCREENING RISK: NORMAL
EGFR: 124 ML/MIN/1.73M2 — SIGNIFICANT CHANGE UP
EOSINOPHIL # BLD AUTO: 0.02 K/UL — SIGNIFICANT CHANGE UP (ref 0–0.5)
EOSINOPHIL NFR BLD AUTO: 0.2 % — SIGNIFICANT CHANGE UP (ref 0–6)
EPI CELLS # UR: ABNORMAL
FIRST TRIMESTER SCREEN COMMENTS: NORMAL
FIRST TRIMESTER SCREEN NOTE: NORMAL
FIRST TRIMESTER SCREEN RESULTS: NORMAL
FIRST TRIMESTER SCREEN TEST RESULTS: NORMAL
GEST. AGE ON COLLECTION DATE: 13.4 WEEKS
GLUCOSE SERPL-MCNC: 86 MG/DL — SIGNIFICANT CHANGE UP (ref 70–99)
GLUCOSE UR QL: NEGATIVE MG/DL — SIGNIFICANT CHANGE UP
HCG MOM: 1.08
HCG SERPL-ACNC: HIGH MIU/ML
HCG VALUE: 80.3 IU/ML
HCT VFR BLD CALC: 36.6 % — SIGNIFICANT CHANGE UP (ref 34.5–45)
HGB BLD-MCNC: 12.7 G/DL — SIGNIFICANT CHANGE UP (ref 11.5–15.5)
IMM GRANULOCYTES NFR BLD AUTO: 0.5 % — SIGNIFICANT CHANGE UP (ref 0–0.9)
KETONES UR-MCNC: NEGATIVE — SIGNIFICANT CHANGE UP
LEUKOCYTE ESTERASE UR-ACNC: NEGATIVE — SIGNIFICANT CHANGE UP
LYMPHOCYTES # BLD AUTO: 1.59 K/UL — SIGNIFICANT CHANGE UP (ref 1–3.3)
LYMPHOCYTES # BLD AUTO: 18.5 % — SIGNIFICANT CHANGE UP (ref 13–44)
MATERNAL AGE AT EDD: 35.2 YR
MCHC RBC-ENTMCNC: 30 PG — SIGNIFICANT CHANGE UP (ref 27–34)
MCHC RBC-ENTMCNC: 34.7 GM/DL — SIGNIFICANT CHANGE UP (ref 32–36)
MCV RBC AUTO: 86.5 FL — SIGNIFICANT CHANGE UP (ref 80–100)
MONOCYTES # BLD AUTO: 0.56 K/UL — SIGNIFICANT CHANGE UP (ref 0–0.9)
MONOCYTES NFR BLD AUTO: 6.5 % — SIGNIFICANT CHANGE UP (ref 2–14)
NEUTROPHILS # BLD AUTO: 6.35 K/UL — SIGNIFICANT CHANGE UP (ref 1.8–7.4)
NEUTROPHILS NFR BLD AUTO: 73.7 % — SIGNIFICANT CHANGE UP (ref 43–77)
NITRITE UR-MCNC: NEGATIVE — SIGNIFICANT CHANGE UP
NT MOM TWIN B: NORMAL
NT TWIN B: NORMAL
NUCHAL TRANSLUCENCY (NT): 1.7 MM
NUCHAL TRANSLUCENCY MOM: 0.84
NUMBER OF FETUSES: 1
PAPP-A MOM: 0.88
PAPP-A VALUE: 982.8 NG/ML
PH UR: 7 — SIGNIFICANT CHANGE UP (ref 5–8)
PLATELET # BLD AUTO: 268 K/UL — SIGNIFICANT CHANGE UP (ref 150–400)
POTASSIUM SERPL-MCNC: 4.1 MMOL/L — SIGNIFICANT CHANGE UP (ref 3.5–5.3)
POTASSIUM SERPL-SCNC: 4.1 MMOL/L — SIGNIFICANT CHANGE UP (ref 3.5–5.3)
PROT SERPL-MCNC: 7.1 G/DL — SIGNIFICANT CHANGE UP (ref 6.6–8.7)
PROT UR-MCNC: 15
RACE: NORMAL
RBC # BLD: 4.23 M/UL — SIGNIFICANT CHANGE UP (ref 3.8–5.2)
RBC # FLD: 13.2 % — SIGNIFICANT CHANGE UP (ref 10.3–14.5)
RBC CASTS # UR COMP ASSIST: SIGNIFICANT CHANGE UP /HPF (ref 0–4)
SODIUM SERPL-SCNC: 137 MMOL/L — SIGNIFICANT CHANGE UP (ref 135–145)
SONOGRAPHER ID#: NORMAL
SP GR SPEC: 1 — LOW (ref 1.01–1.02)
TRISOMY 18 AGE RISK: NORMAL
TRISOMY 18 INTERPRETATION: NORMAL
TRISOMY 18 SCREENING RISK: NORMAL
UROBILINOGEN FLD QL: NEGATIVE MG/DL — SIGNIFICANT CHANGE UP
WBC # BLD: 8.61 K/UL — SIGNIFICANT CHANGE UP (ref 3.8–10.5)
WBC # FLD AUTO: 8.61 K/UL — SIGNIFICANT CHANGE UP (ref 3.8–10.5)
WBC UR QL: SIGNIFICANT CHANGE UP /HPF (ref 0–5)
WEIGHT AFP: 181 LBS

## 2023-06-10 PROCEDURE — 81001 URINALYSIS AUTO W/SCOPE: CPT

## 2023-06-10 PROCEDURE — 99284 EMERGENCY DEPT VISIT MOD MDM: CPT

## 2023-06-10 PROCEDURE — 76815 OB US LIMITED FETUS(S): CPT

## 2023-06-10 PROCEDURE — 80053 COMPREHEN METABOLIC PANEL: CPT

## 2023-06-10 PROCEDURE — 84702 CHORIONIC GONADOTROPIN TEST: CPT

## 2023-06-10 PROCEDURE — 76815 OB US LIMITED FETUS(S): CPT | Mod: 26

## 2023-06-10 PROCEDURE — 76817 TRANSVAGINAL US OBSTETRIC: CPT

## 2023-06-10 PROCEDURE — 87086 URINE CULTURE/COLONY COUNT: CPT

## 2023-06-10 PROCEDURE — 85025 COMPLETE CBC W/AUTO DIFF WBC: CPT

## 2023-06-10 PROCEDURE — 76817 TRANSVAGINAL US OBSTETRIC: CPT | Mod: 26

## 2023-06-10 PROCEDURE — 36415 COLL VENOUS BLD VENIPUNCTURE: CPT

## 2023-06-10 NOTE — ED STATDOCS - PROGRESS NOTE DETAILS
Pt reports improvement of symptoms in the ED  Labs reviewed and grossly unremarkable  UA unremarkable   US pelvis: (+) single live intrauterine pregnancy 14 weeks 4 days  with  bpm  Instructed to take tylenol prn pain and to f/u with OG/GYN within 2-3 days.   Strict ED return precautions given if any new or worsening symptoms

## 2023-06-10 NOTE — ED STATDOCS - OBJECTIVE STATEMENT
33 y/o female with PMHx of ovarian cysts states she woke up with sharp left lower abdominal pain starting this morning. Reports pain is improved when standing. No nausea, vomiting, diarrhea, vaginal bleeding, abnormal vaginal discharge, urinary urgency, dysuria. Patient is 13 weeks pregnant. . No in vitro or ovulation stimulating hormones. Reports 2 past pregnancies were cesarian sections. No other abdominal surgeries. LMP was 3/9/23. Reports seeing OB Dr. Lopez 3 days ago. Reports taking plaquenil for joint pain during pregnancy. States she did not take pain medication 31 y/o female with PMHx of ovarian cysts states she woke up with sharp constant left lower abdominal pain starting this morning. Reports pain is improved when standing. No nausea, vomiting, diarrhea, vaginal bleeding, abnormal vaginal discharge, urinary urgency, dysuria. Patient is 13 weeks pregnant. . No in vitro or ovulation stimulating hormones. Reports 2 past pregnancies were cesarian sections. No other abdominal surgeries. LMP was 3/9/23. Reports seeing OB Dr. Lopez 3 days ago and was told to come to ER. Reports taking plaquenil for joint pain during pregnancy. States she did not take pain medication

## 2023-06-10 NOTE — ED ADULT NURSE NOTE - NSICDXPASTMEDICALHX_GEN_ALL_CORE_FT
PAST MEDICAL HISTORY:  Anxiety on no meds    HSV-1 infection     Rheumatoid arthritis On Planquinil for last few months    Rheumatoid arthritis

## 2023-06-10 NOTE — ED STATDOCS - NS ED ROS FT
REVIEW OF SYSTEMS:  General:  no fever, no chills  HEENT: no headache, no vision changes  Cardiac: no chest pain, no palpitations  Respiratory: no cough, no shortness of breath  Gastrointestinal: (+) abdominal pain, no nausea, no vomiting, no diarrhea, no melena, no hematochezia   Genitourinary: no hematuria, no dysuria, no urinary frequency, no urinary hesitancy, no vaginal bleeding or abnormal discharge  Extremities: no extremity swelling, no extremity pain  Neuro: no focal weakness, no numbness/tingling of the extremities, no decreased sensation  Heme: no easy bleeding, no easy bruising, no anemia  Skin: no abrasions, no jaundice, no pruritis, no rashes, no lesions  -Brooklyn Cali MD Attending Physician REVIEW OF SYSTEMS:  General:  no fever, no chills  HEENT: no headache, no vision changes  Cardiac: no chest pain, no palpitations  Respiratory: no cough, no shortness of breath  Gastrointestinal: (+) abdominal pain, no nausea, no vomiting, no diarrhea, no melena, no hematochezia   Genitourinary: no hematuria, no dysuria, no urinary frequency, no urinary hesitancy, no vaginal bleeding or abnormal discharge  -Brooklyn Cali MD Attending Physician

## 2023-06-10 NOTE — ED STATDOCS - CLINICAL SUMMARY MEDICAL DECISION MAKING FREE TEXT BOX
History and exam as noted. LLQ abdominal pain in pregnancy without abnormal discharge or bleeding. consider ovarian cyst vs torsion(less likely) vs round ligament pain. US to evaluate pain and evaluate pregnancy. hold off on coag and typing screen at this point as patient is not bleeding. History and exam as noted. LLQ abdominal pain in pregnancy without abnormal discharge or bleeding. consider ovarian cyst vs torsion(clinical suspicion less likely) vs round ligament pain. US to evaluate for ovarian pathology and evaluate pregnancy. hold off on coag and typing screen at this point as patient is not bleeding.

## 2023-06-10 NOTE — ED ADULT NURSE NOTE - NSFALLUNIVINTERV_ED_ALL_ED
Bed/Stretcher in lowest position, wheels locked, appropriate side rails in place/Call bell, personal items and telephone in reach/Instruct patient to call for assistance before getting out of bed/chair/stretcher/Non-slip footwear applied when patient is off stretcher/[place] to call system/Physically safe environment - no spills, clutter or unnecessary equipment/Purposeful proactive rounding/Room/bathroom lighting operational, light cord in reach

## 2023-06-10 NOTE — ED STATDOCS - PATIENT PORTAL LINK FT
You can access the FollowMyHealth Patient Portal offered by [facility] by registering at the following website: [email or web address]. By joining [facility]’s FollowMyHealth portal, you will also be able to view your health information using other applications (apps) compatible with our system.

## 2023-06-10 NOTE — ED STATDOCS - NSFOLLOWUPINSTRUCTIONS_ED_ALL_ED_FT
Please take tylenol as needed for pain  Please follow up with your OB/GYN within 2-3 days  Return to the emergency room if you are experiencing any new or worsening symptoms      SEEK IMMEDIATE MEDICAL CARE IF YOU HAVE ANY OF THE FOLLOWING SYMPTOMS: heavy vaginal bleeding, severe low back or abdominal cramps, fever/chills, lightheadedness/dizziness, or fainting.

## 2023-06-10 NOTE — ED STATDOCS - PHYSICAL EXAMINATION
PHYSICAL EXAM:   General: well-appearing, appears stated age, not in extremis   HEENT: NC/AT, PERRLA, conjunctiva pink, airway patent  Cardiovascular: regular rate and rhythm, + S1/S2, no murmurs, rubs, gallops appreciated  Respiratory: clear to auscultation bilaterally, good aeration bilaterally, nonlabored respirations  Abdominal: (+) mild LLQ tenderness to palpation, soft, nondistended, no rebound, guarding or rigidity  Back: no costovertebral tenderness, no rashes noted  Extremities: no LE edema b/l. Radial pulses equal and strong b/l  Neuro: Alert and oriented x3. Moving all extremities. Ambulatory.  Psychiatric: appropriate mood and affect.   Skin: appropriate color, warm, dry.   -Brooklyn Cali MD Attending Physician PHYSICAL EXAM:   General: well-appearing, appears stated age, not in extremis   HEENT: NC/AT, airway patent  Cardiovascular: regular rate  Respiratory: saturating well on RA, nonlabored respirations  Abdominal: (+) mild LLQ tenderness to palpation, soft, nondistended, no rebound, guarding or rigidity  Back: no costovertebral tenderness,   Neuro: awake alert interactive  Psychiatric: appropriate mood and affect.   -Brooklyn Cali MD Attending Physician

## 2023-06-10 NOTE — ED ADULT NURSE NOTE - OBJECTIVE STATEMENT
patient with abdominal pain since this morning, is pregnant. denies nausea/vomiting or diarrhea, denies fever, denies vaginal bleeding. alert and oriented x4, no signs of distress at this time. skin warm and dry.

## 2023-06-10 NOTE — ED STATDOCS - NSICDXPASTMEDICALHX_GEN_ALL_CORE_FT
PAST MEDICAL HISTORY:  Anxiety on no meds    HSV-1 infection     Rheumatoid arthritis On Planquinil for last few months    Rheumatoid arthritis      PAST MEDICAL HISTORY:  Anxiety on no meds    HSV-1 infection     Ovarian cyst     Rheumatoid arthritis On Planquinil for last few months    Rheumatoid arthritis

## 2023-06-11 LAB
CULTURE RESULTS: SIGNIFICANT CHANGE UP
SPECIMEN SOURCE: SIGNIFICANT CHANGE UP

## 2023-06-12 ENCOUNTER — NON-APPOINTMENT (OUTPATIENT)
Age: 33
End: 2023-06-12

## 2023-06-22 ENCOUNTER — APPOINTMENT (OUTPATIENT)
Dept: OBGYN | Facility: CLINIC | Age: 33
End: 2023-06-22
Payer: MEDICAID

## 2023-06-22 PROBLEM — N83.209 UNSPECIFIED OVARIAN CYST, UNSPECIFIED SIDE: Chronic | Status: ACTIVE | Noted: 2023-06-10

## 2023-06-22 PROCEDURE — ZZZZZ: CPT

## 2023-06-22 NOTE — OB RN INTRAOPERATIVE NOTE - NS_SPECIMENS_OBGYN_ALL_OB
Penetrating injury to the face and left thigh from    - Clinical evaluation and CT imaging consistent with left eye injury including globe rupture  - Patient with clear and serosanguineous fluid draining from left eye           - Patient without any visual acuity in the left eye on presentation  - OR for neck exploration and interventions as indicated as well as facial wound washout and management  - Airway monitoring           - Patient likely to remain intubated postoperatively for transfer to outside hospital for further evaluation and management of left eye injury   - Local wound care as indicated  - Analgesia as needed  - Update tetanus vaccine status    S/p washout and suturing, continue site exams No

## 2023-06-25 LAB
APPEARANCE: CLEAR
BACTERIA UR CULT: NORMAL
BACTERIA: NEGATIVE /HPF
BILIRUBIN URINE: NEGATIVE
BLOOD URINE: NEGATIVE
CAST: 0 /LPF
COLOR: YELLOW
EPITHELIAL CELLS: 4 /HPF
GLUCOSE QUALITATIVE U: NEGATIVE MG/DL
KETONES URINE: NEGATIVE MG/DL
LEUKOCYTE ESTERASE URINE: NEGATIVE
MICROSCOPIC-UA: NORMAL
NITRITE URINE: NEGATIVE
PH URINE: 7
PROTEIN URINE: NEGATIVE MG/DL
RED BLOOD CELLS URINE: 0 /HPF
SPECIFIC GRAVITY URINE: 1.01
UROBILINOGEN URINE: 0.2 MG/DL
WHITE BLOOD CELLS URINE: 0 /HPF

## 2023-06-30 ENCOUNTER — NON-APPOINTMENT (OUTPATIENT)
Age: 33
End: 2023-06-30

## 2023-07-05 ENCOUNTER — APPOINTMENT (OUTPATIENT)
Dept: OBGYN | Facility: CLINIC | Age: 33
End: 2023-07-05
Payer: MEDICAID

## 2023-07-05 VITALS — WEIGHT: 187 LBS | BODY MASS INDEX: 34.2 KG/M2 | DIASTOLIC BLOOD PRESSURE: 73 MMHG | SYSTOLIC BLOOD PRESSURE: 114 MMHG

## 2023-07-05 PROCEDURE — 99213 OFFICE O/P EST LOW 20 MIN: CPT | Mod: TH

## 2023-07-09 ENCOUNTER — OUTPATIENT (OUTPATIENT)
Dept: INPATIENT UNIT | Facility: HOSPITAL | Age: 33
LOS: 1 days | Discharge: HOME | End: 2023-07-09
Payer: MEDICAID

## 2023-07-09 VITALS
HEART RATE: 88 BPM | RESPIRATION RATE: 15 BRPM | DIASTOLIC BLOOD PRESSURE: 71 MMHG | SYSTOLIC BLOOD PRESSURE: 120 MMHG | TEMPERATURE: 97 F

## 2023-07-09 VITALS — SYSTOLIC BLOOD PRESSURE: 104 MMHG | HEART RATE: 72 BPM | DIASTOLIC BLOOD PRESSURE: 50 MMHG

## 2023-07-09 DIAGNOSIS — O26.899 OTHER SPECIFIED PREGNANCY RELATED CONDITIONS, UNSPECIFIED TRIMESTER: ICD-10-CM

## 2023-07-09 DIAGNOSIS — Z98.890 OTHER SPECIFIED POSTPROCEDURAL STATES: Chronic | ICD-10-CM

## 2023-07-09 DIAGNOSIS — Z98.891 HISTORY OF UTERINE SCAR FROM PREVIOUS SURGERY: Chronic | ICD-10-CM

## 2023-07-09 PROCEDURE — 99221 1ST HOSP IP/OBS SF/LOW 40: CPT

## 2023-07-09 RX ORDER — ACETAMINOPHEN 500 MG
1000 TABLET ORAL ONCE
Refills: 0 | Status: COMPLETED | OUTPATIENT
Start: 2023-07-09 | End: 2023-07-09

## 2023-07-09 RX ADMIN — Medication 1000 MILLIGRAM(S): at 17:40

## 2023-07-09 NOTE — OB RN TRIAGE NOTE - NSICDXPASTMEDICALHX_GEN_ALL_CORE_FT
PAST MEDICAL HISTORY:  Anxiety on no meds    HSV-1 infection     Ovarian cyst     Rheumatoid arthritis On Planquinil for last few months    Rheumatoid arthritis

## 2023-07-09 NOTE — OB PROVIDER TRIAGE NOTE - NSOBPROVIDERNOTE_OBGYN_ALL_OB_FT
MsKerri RODRIGUEZHORTENSIACLARISSA ELIAS is a 32y  @ 18w2d presenting after a fall @ 1pm    - 1000mg tylenol given  - Warm packs given      Plan  - Reevaluate pain Ms. HORTENSIA ELIAS is a 32y  @ 18w2d presenting after a fall @ 1pm without evidence of  labor at this time with reassuring fetal and maternal status     - 1000mg tylenol given  - Warm packs given      Plan  - Reevaluate pain    Plan  - Patient to be discharged home with follow up and return precautions  - Please follow up with Dr. Lopez at your next scheduled appointment.   - You have musculoskeletal back pain. Please do stretches and stay active. You can take Tylenol for your pain, nothing else. You can apply warm / cold compresses to your back depending on your comfort.   - Please return for vaginal bleeding similar to that of a period, leaking/gush of fluid, regular contractions occurring 4-5 minutes for one hour or requiring pain medication   - Patient  educated of plan and demonstrate understanding. All questions answered. Discharge instructions provided and signed.   - Discharged at:     Plan d/w Dr. Lopez Ms. HORTENSIA ELIAS is a 32y  @ 18w2d presenting after a fall @ 1pm without evidence of  labor at this time with reassuring fetal and maternal status     - 1000mg tylenol given  - Warm packs given    Plan  - Patient to be discharged home with follow up and return precautions  - Please follow up with Dr. Lopez at your next scheduled appointment.   - You have musculoskeletal back pain. Please do stretches and stay active. You can take Tylenol for your pain, nothing else. You can apply warm / cold compresses to your back depending on your comfort.   - Please return for vaginal bleeding similar to that of a period, leaking/gush of fluid, regular contractions occurring 4-5 minutes for one hour or requiring pain medication   - Patient  educated of plan and demonstrate understanding. All questions answered. Discharge instructions provided and signed.   - Discharged at: 18:15    Plan d/w Dr. Lopez

## 2023-07-09 NOTE — OB PROVIDER TRIAGE NOTE - NSHPPHYSICALEXAM_GEN_ALL_CORE
T(C): 36.1 (07-09-23 @ 16:50), Max: 36.1 (07-09-23 @ 16:17)  HR: 88 (07-09-23 @ 16:50) (88 - 88)  BP: 120/71 (07-09-23 @ 16:50) (120/71 - 120/71)  RR: 15 (07-09-23 @ 16:17) (15 - 15)  SpO2: --  General: Female sitting comfortably in no apparent distress   Head: Normocephalic. Atraumatic.   Eyes: No discharge, lids normal, conjunctiva normal  Lungs: No resp distress  Abdomen: Soft, nontender. Gravid.   TAUS:  Pt not in work list.    Neuro: No facial asymmetry, no slurred speech, moves all 4 extremities  Mood: Alert and lucid, appropriate mood and affect

## 2023-07-09 NOTE — OB RN TRIAGE NOTE - INTERNATIONAL TRAVEL
No Was The Patient On Physician Recommended Anticoagulation Therapy?: Please Select the Appropriate Response

## 2023-07-09 NOTE — OB PROVIDER TRIAGE NOTE - HISTORY OF PRESENT ILLNESS
Ms. HORTENSIA ELIAS is a 32y  @ 18w2d presenting after a fall @ 1pm. Was pushing child on swing while holding the other child, lost balance, and stumbled backwards onto the right side of her back. Denies head trauma / loc / direct abdominal trauma, vb, abdominal pain, ctx.   PNC: 2 prior c/s. Denies prenatal issues or complications. Pt reports no hospitalizations, procedures, infections, or new diagnoses in pregnancy. Pt denies complications with blood pressure and/or blood sugar.

## 2023-07-09 NOTE — OB PROVIDER TRIAGE NOTE - ADDITIONAL INSTRUCTIONS
- Please follow up with Dr. Lopez at your next scheduled appointment.   - You have musculoskeletal back pain. Please do stretches and stay active. You can take Tylenol for your pain, nothing else. You can apply warm / cold compresses to your back depending on your comfort.   - Please return for vaginal bleeding similar to that of a period, leaking/gush of fluid, regular contractions occurring 4-5 minutes for one hour or requiring pain medication

## 2023-07-11 DIAGNOSIS — O34.82 MATERNAL CARE FOR OTHER ABNORMALITIES OF PELVIC ORGANS, SECOND TRIMESTER: ICD-10-CM

## 2023-07-11 DIAGNOSIS — O34.219 MATERNAL CARE FOR UNSPECIFIED TYPE SCAR FROM PREVIOUS CESAREAN DELIVERY: ICD-10-CM

## 2023-07-11 DIAGNOSIS — O99.342 OTHER MENTAL DISORDERS COMPLICATING PREGNANCY, SECOND TRIMESTER: ICD-10-CM

## 2023-07-11 DIAGNOSIS — F41.9 ANXIETY DISORDER, UNSPECIFIED: ICD-10-CM

## 2023-07-11 DIAGNOSIS — Z04.3 ENCOUNTER FOR EXAMINATION AND OBSERVATION FOLLOWING OTHER ACCIDENT: ICD-10-CM

## 2023-07-11 DIAGNOSIS — O09.293 SUPERVISION OF PREGNANCY WITH OTHER POOR REPRODUCTIVE OR OBSTETRIC HISTORY, THIRD TRIMESTER: ICD-10-CM

## 2023-07-11 DIAGNOSIS — O99.343 OTHER MENTAL DISORDERS COMPLICATING PREGNANCY, THIRD TRIMESTER: ICD-10-CM

## 2023-07-11 DIAGNOSIS — Z3A.18 18 WEEKS GESTATION OF PREGNANCY: ICD-10-CM

## 2023-07-11 DIAGNOSIS — N83.209 UNSPECIFIED OVARIAN CYST, UNSPECIFIED SIDE: ICD-10-CM

## 2023-07-11 DIAGNOSIS — O34.83 MATERNAL CARE FOR OTHER ABNORMALITIES OF PELVIC ORGANS, THIRD TRIMESTER: ICD-10-CM

## 2023-07-11 DIAGNOSIS — O09.292 SUPERVISION OF PREGNANCY WITH OTHER POOR REPRODUCTIVE OR OBSTETRIC HISTORY, SECOND TRIMESTER: ICD-10-CM

## 2023-07-13 LAB
AFP MOM: 0.87
AFP VALUE: 29.5 NG/ML
ALPHA FETOPROTEIN SERUM COMMENT: NORMAL
ALPHA FETOPROTEIN SERUM INTERPRETATION: NORMAL
ALPHA FETOPROTEIN SERUM RESULTS: NORMAL
ALPHA FETOPROTEIN SERUM TEST RESULTS: NORMAL
GESTATIONAL AGE BASED ON: NORMAL
GESTATIONAL AGE ON COLLECTION DATE: 16.9 WEEKS
INSULIN DEP DIABETES: NO
MATERNAL AGE AT EDD AFP: 35.2 YR
MULTIPLE GESTATION: NO
OSBR RISK 1 IN: NORMAL
RACE: NORMAL
WEIGHT AFP: 187 LBS

## 2023-08-09 ENCOUNTER — APPOINTMENT (OUTPATIENT)
Dept: ANTEPARTUM | Facility: CLINIC | Age: 33
End: 2023-08-09
Payer: MEDICAID

## 2023-08-09 ENCOUNTER — APPOINTMENT (OUTPATIENT)
Dept: OBGYN | Facility: CLINIC | Age: 33
End: 2023-08-09
Payer: MEDICAID

## 2023-08-09 VITALS
DIASTOLIC BLOOD PRESSURE: 75 MMHG | BODY MASS INDEX: 35.15 KG/M2 | SYSTOLIC BLOOD PRESSURE: 121 MMHG | HEIGHT: 62 IN | WEIGHT: 191 LBS

## 2023-08-09 PROCEDURE — 76817 TRANSVAGINAL US OBSTETRIC: CPT

## 2023-08-09 PROCEDURE — 99213 OFFICE O/P EST LOW 20 MIN: CPT | Mod: TH

## 2023-08-09 PROCEDURE — 76811 OB US DETAILED SNGL FETUS: CPT

## 2023-09-05 NOTE — ED PROVIDER NOTE - DOMESTIC TRAVEL HIGH RISK QUESTION
[de-identified] : The patient appears well nourished and in no apparent distress.  The patient is alert and oriented to person, place, and time.   Affect and mood appear normal. The head is normocephalic and atraumatic.  The eyes reveal normal sclera and extra ocular muscles are intact. The tongue is midline with no apparent lesions.  Skin shows normal turgor with no evidence of eczema or psoriasis.  No respiratory distress noted.  Sensation grossly intact.		  [de-identified] : Exam of the right hip shows hip external rotation of 60 degrees, hip internal rotation of 25 degrees. She has pain and difficulty with straight leg raise.  5/5 motor strength bilaterally distally. Sensation intact distally.		  [de-identified] : X-ray: AP view of the pelvis and 2 views of the right hip demonstrate femoral head stage 4 AVN with articular surface collapse.  No

## 2023-09-06 ENCOUNTER — APPOINTMENT (OUTPATIENT)
Dept: OBGYN | Facility: CLINIC | Age: 33
End: 2023-09-06
Payer: MEDICAID

## 2023-09-06 VITALS — DIASTOLIC BLOOD PRESSURE: 76 MMHG | WEIGHT: 199 LBS | SYSTOLIC BLOOD PRESSURE: 133 MMHG | BODY MASS INDEX: 36.4 KG/M2

## 2023-09-06 DIAGNOSIS — Z34.90 ENCOUNTER FOR SUPERVISION OF NORMAL PREGNANCY, UNSPECIFIED, UNSPECIFIED TRIMESTER: ICD-10-CM

## 2023-09-06 PROCEDURE — 99213 OFFICE O/P EST LOW 20 MIN: CPT | Mod: TH,25

## 2023-09-07 ENCOUNTER — NON-APPOINTMENT (OUTPATIENT)
Age: 33
End: 2023-09-07

## 2023-09-07 LAB
GLUCOSE 1H P 50 G GLC PO SERPL-MCNC: 116 MG/DL
HCT VFR BLD CALC: 35.6 %
HGB BLD-MCNC: 11.8 G/DL
MCHC RBC-ENTMCNC: 29.9 PG
MCHC RBC-ENTMCNC: 33.1 GM/DL
MCV RBC AUTO: 90.1 FL
PLATELET # BLD AUTO: 245 K/UL
RBC # BLD: 3.95 M/UL
RBC # FLD: 13.3 %
WBC # FLD AUTO: 11.04 K/UL

## 2023-10-04 ENCOUNTER — APPOINTMENT (OUTPATIENT)
Dept: OBGYN | Facility: CLINIC | Age: 33
End: 2023-10-04
Payer: MEDICAID

## 2023-10-04 ENCOUNTER — ASOB RESULT (OUTPATIENT)
Age: 33
End: 2023-10-04

## 2023-10-04 ENCOUNTER — NON-APPOINTMENT (OUTPATIENT)
Age: 33
End: 2023-10-04

## 2023-10-04 VITALS
BODY MASS INDEX: 37.73 KG/M2 | DIASTOLIC BLOOD PRESSURE: 76 MMHG | HEIGHT: 62 IN | SYSTOLIC BLOOD PRESSURE: 121 MMHG | WEIGHT: 205 LBS

## 2023-10-04 PROCEDURE — 99213 OFFICE O/P EST LOW 20 MIN: CPT | Mod: TH

## 2023-10-18 ENCOUNTER — APPOINTMENT (OUTPATIENT)
Dept: OBGYN | Facility: CLINIC | Age: 33
End: 2023-10-18
Payer: MEDICAID

## 2023-10-18 VITALS
DIASTOLIC BLOOD PRESSURE: 68 MMHG | HEIGHT: 62 IN | SYSTOLIC BLOOD PRESSURE: 105 MMHG | BODY MASS INDEX: 38.28 KG/M2 | WEIGHT: 208 LBS

## 2023-10-18 PROCEDURE — 99212 OFFICE O/P EST SF 10 MIN: CPT | Mod: TH

## 2023-10-24 ENCOUNTER — NON-APPOINTMENT (OUTPATIENT)
Age: 33
End: 2023-10-24

## 2023-11-03 ENCOUNTER — APPOINTMENT (OUTPATIENT)
Dept: OBGYN | Facility: CLINIC | Age: 33
End: 2023-11-03
Payer: MEDICAID

## 2023-11-03 VITALS
DIASTOLIC BLOOD PRESSURE: 72 MMHG | SYSTOLIC BLOOD PRESSURE: 110 MMHG | WEIGHT: 211 LBS | BODY MASS INDEX: 38.83 KG/M2 | HEIGHT: 62 IN

## 2023-11-03 PROCEDURE — 99213 OFFICE O/P EST LOW 20 MIN: CPT | Mod: TH

## 2023-11-11 ENCOUNTER — OUTPATIENT (OUTPATIENT)
Dept: INPATIENT UNIT | Facility: HOSPITAL | Age: 33
LOS: 1 days | End: 2023-11-11
Payer: COMMERCIAL

## 2023-11-11 VITALS
RESPIRATION RATE: 20 BRPM | DIASTOLIC BLOOD PRESSURE: 52 MMHG | HEART RATE: 89 BPM | SYSTOLIC BLOOD PRESSURE: 109 MMHG | TEMPERATURE: 98 F

## 2023-11-11 DIAGNOSIS — Z98.891 HISTORY OF UTERINE SCAR FROM PREVIOUS SURGERY: Chronic | ICD-10-CM

## 2023-11-11 DIAGNOSIS — Z98.890 OTHER SPECIFIED POSTPROCEDURAL STATES: Chronic | ICD-10-CM

## 2023-11-11 DIAGNOSIS — O26.899 OTHER SPECIFIED PREGNANCY RELATED CONDITIONS, UNSPECIFIED TRIMESTER: ICD-10-CM

## 2023-11-11 LAB
APTT BLD: 27.2 SEC — SIGNIFICANT CHANGE UP (ref 24.5–35.6)
APTT BLD: 27.2 SEC — SIGNIFICANT CHANGE UP (ref 24.5–35.6)
BLD GP AB SCN SERPL QL: SIGNIFICANT CHANGE UP
BLD GP AB SCN SERPL QL: SIGNIFICANT CHANGE UP
FIBRINOGEN PPP-MCNC: 545 MG/DL — HIGH (ref 200–450)
FIBRINOGEN PPP-MCNC: 545 MG/DL — HIGH (ref 200–450)
HCT VFR BLD CALC: 37.8 % — SIGNIFICANT CHANGE UP (ref 34.5–45)
HCT VFR BLD CALC: 37.8 % — SIGNIFICANT CHANGE UP (ref 34.5–45)
HGB BLD-MCNC: 13.1 G/DL — SIGNIFICANT CHANGE UP (ref 11.5–15.5)
HGB BLD-MCNC: 13.1 G/DL — SIGNIFICANT CHANGE UP (ref 11.5–15.5)
INR BLD: 0.97 RATIO — SIGNIFICANT CHANGE UP (ref 0.85–1.18)
INR BLD: 0.97 RATIO — SIGNIFICANT CHANGE UP (ref 0.85–1.18)
MCHC RBC-ENTMCNC: 29.3 PG — SIGNIFICANT CHANGE UP (ref 27–34)
MCHC RBC-ENTMCNC: 29.3 PG — SIGNIFICANT CHANGE UP (ref 27–34)
MCHC RBC-ENTMCNC: 34.7 GM/DL — SIGNIFICANT CHANGE UP (ref 32–36)
MCHC RBC-ENTMCNC: 34.7 GM/DL — SIGNIFICANT CHANGE UP (ref 32–36)
MCV RBC AUTO: 84.6 FL — SIGNIFICANT CHANGE UP (ref 80–100)
MCV RBC AUTO: 84.6 FL — SIGNIFICANT CHANGE UP (ref 80–100)
PLATELET # BLD AUTO: 265 K/UL — SIGNIFICANT CHANGE UP (ref 150–400)
PLATELET # BLD AUTO: 265 K/UL — SIGNIFICANT CHANGE UP (ref 150–400)
PROTHROM AB SERPL-ACNC: 10.8 SEC — SIGNIFICANT CHANGE UP (ref 9.5–13)
PROTHROM AB SERPL-ACNC: 10.8 SEC — SIGNIFICANT CHANGE UP (ref 9.5–13)
RBC # BLD: 4.47 M/UL — SIGNIFICANT CHANGE UP (ref 3.8–5.2)
RBC # BLD: 4.47 M/UL — SIGNIFICANT CHANGE UP (ref 3.8–5.2)
RBC # FLD: 12.8 % — SIGNIFICANT CHANGE UP (ref 10.3–14.5)
RBC # FLD: 12.8 % — SIGNIFICANT CHANGE UP (ref 10.3–14.5)
WBC # BLD: 11.7 K/UL — HIGH (ref 3.8–10.5)
WBC # BLD: 11.7 K/UL — HIGH (ref 3.8–10.5)
WBC # FLD AUTO: 11.7 K/UL — HIGH (ref 3.8–10.5)
WBC # FLD AUTO: 11.7 K/UL — HIGH (ref 3.8–10.5)

## 2023-11-11 PROCEDURE — 85730 THROMBOPLASTIN TIME PARTIAL: CPT

## 2023-11-11 PROCEDURE — 86850 RBC ANTIBODY SCREEN: CPT

## 2023-11-11 PROCEDURE — 85610 PROTHROMBIN TIME: CPT

## 2023-11-11 PROCEDURE — 59025 FETAL NON-STRESS TEST: CPT

## 2023-11-11 PROCEDURE — 86900 BLOOD TYPING SEROLOGIC ABO: CPT

## 2023-11-11 PROCEDURE — G0463: CPT

## 2023-11-11 PROCEDURE — 86901 BLOOD TYPING SEROLOGIC RH(D): CPT

## 2023-11-11 PROCEDURE — 85384 FIBRINOGEN ACTIVITY: CPT

## 2023-11-11 PROCEDURE — 36415 COLL VENOUS BLD VENIPUNCTURE: CPT

## 2023-11-11 PROCEDURE — 85027 COMPLETE CBC AUTOMATED: CPT

## 2023-11-11 NOTE — OB PROVIDER TRIAGE NOTE - NSOBPROVIDERNOTE_OBGYN_ALL_OB_FT
A/P: 33y  at 35w2d GA who presents to L&D s/p fall.  - VSS  - FHT reassuring, CTX q3-7min; continuous monitoring for 4 hrs post fall  - FU CBC, coags, T&S  - BPP   - SSE: no VB present    Discussed with Dr. Lopez A/P: 33y  at 35w2d GA who presents to L&D s/p fall.  - VSS  - FHT reassuring, CTX q3-7min; continuous monitoring for 4 hrs post fall  - FU CBC, coags, T&S  - BPP   - SSE: no VB present    CBC and coags wnl, Rh positive. CTX on toco however patient not endorsing CTX, FHT reassuring. Plan to continue outpatient follow up with OBGYN. Return precautions given.     Discussed with Dr. Lopez

## 2023-11-11 NOTE — OB PROVIDER TRIAGE NOTE - HISTORY OF PRESENT ILLNESS
33y  at 35w2d GA who presents to L&D s/p fall. Patient slipped down 2 steps at 2330; states felt initial abdominal cramp, now resolved. Patient denies vaginal bleeding and leakage of fluid. She endorses good fetal movement. Denies fevers, chills, nausea, vomiting, chest pain, SOB, dizziness and headache. No other complaints at this time.     Prenatal course uncomplicated.      POB:  G1: CS ()  G2: SAB ()  G3: CS ()  PGYN: -fibroids, -ovarian cysts, denies STD hx, denies abnormal PAPs   PMH: RA  PSH: Denies  SH: Denies EtOH, tobacco and illicit drug use during this pregnancy; feels safe at home   Meds: PNVs, Plaquenil 200mg BID  Allergies: NKDA

## 2023-11-11 NOTE — OB PROVIDER TRIAGE NOTE - NSHPPHYSICALEXAM_GEN_ALL_CORE
Gen: NAD, well-appearing, AAOx3   Abd: Soft, gravid, nontender  Ext: non-tender, non-edematous    SSE: no bleeding noted, cervix closed on visualization  Bedside sono: anterior placenta, VTX, BPP 8/8, gross fetal movements and breathing noted, ZACKARY 11  FHT: baseline 140, moderate variability, +accels, -decels   [place]: CTX q3-7min

## 2023-11-14 DIAGNOSIS — F41.9 ANXIETY DISORDER, UNSPECIFIED: ICD-10-CM

## 2023-11-14 DIAGNOSIS — Z04.1 ENCOUNTER FOR EXAMINATION AND OBSERVATION FOLLOWING TRANSPORT ACCIDENT: ICD-10-CM

## 2023-11-14 DIAGNOSIS — O34.219 MATERNAL CARE FOR UNSPECIFIED TYPE SCAR FROM PREVIOUS CESAREAN DELIVERY: ICD-10-CM

## 2023-11-14 DIAGNOSIS — N83.209 UNSPECIFIED OVARIAN CYST, UNSPECIFIED SIDE: ICD-10-CM

## 2023-11-14 DIAGNOSIS — O99.891 OTHER SPECIFIED DISEASES AND CONDITIONS COMPLICATING PREGNANCY: ICD-10-CM

## 2023-11-14 DIAGNOSIS — O09.523 SUPERVISION OF ELDERLY MULTIGRAVIDA, THIRD TRIMESTER: ICD-10-CM

## 2023-11-14 DIAGNOSIS — O09.293 SUPERVISION OF PREGNANCY WITH OTHER POOR REPRODUCTIVE OR OBSTETRIC HISTORY, THIRD TRIMESTER: ICD-10-CM

## 2023-11-14 DIAGNOSIS — Z3A.35 35 WEEKS GESTATION OF PREGNANCY: ICD-10-CM

## 2023-11-14 DIAGNOSIS — O99.343 OTHER MENTAL DISORDERS COMPLICATING PREGNANCY, THIRD TRIMESTER: ICD-10-CM

## 2023-11-17 ENCOUNTER — APPOINTMENT (OUTPATIENT)
Dept: OBGYN | Facility: CLINIC | Age: 33
End: 2023-11-17
Payer: MEDICAID

## 2023-11-17 VITALS — BODY MASS INDEX: 39.32 KG/M2 | WEIGHT: 215 LBS | SYSTOLIC BLOOD PRESSURE: 101 MMHG | DIASTOLIC BLOOD PRESSURE: 63 MMHG

## 2023-11-17 DIAGNOSIS — Z3A.36 36 WEEKS GESTATION OF PREGNANCY: ICD-10-CM

## 2023-11-17 DIAGNOSIS — Z3A.37 37 WEEKS GESTATION OF PREGNANCY: ICD-10-CM

## 2023-11-17 PROCEDURE — 99212 OFFICE O/P EST SF 10 MIN: CPT | Mod: TH,25

## 2023-11-19 LAB
BASOPHILS # BLD AUTO: 0.03 K/UL
BASOPHILS NFR BLD AUTO: 0.3 %
EOSINOPHIL # BLD AUTO: 0.04 K/UL
EOSINOPHIL NFR BLD AUTO: 0.4 %
HCT VFR BLD CALC: 40.1 %
HGB BLD-MCNC: 12.4 G/DL
HIV1+2 AB SPEC QL IA.RAPID: NONREACTIVE
IMM GRANULOCYTES NFR BLD AUTO: 0.8 %
LYMPHOCYTES # BLD AUTO: 1.92 K/UL
LYMPHOCYTES NFR BLD AUTO: 20.3 %
MAN DIFF?: NORMAL
MCHC RBC-ENTMCNC: 28.6 PG
MCHC RBC-ENTMCNC: 30.9 GM/DL
MCV RBC AUTO: 92.4 FL
MONOCYTES # BLD AUTO: 0.76 K/UL
MONOCYTES NFR BLD AUTO: 8 %
NEUTROPHILS # BLD AUTO: 6.63 K/UL
NEUTROPHILS NFR BLD AUTO: 70.2 %
PLATELET # BLD AUTO: 286 K/UL
RBC # BLD: 4.34 M/UL
RBC # FLD: 13.8 %
WBC # FLD AUTO: 9.46 K/UL

## 2023-11-21 ENCOUNTER — NON-APPOINTMENT (OUTPATIENT)
Age: 33
End: 2023-11-21

## 2023-11-21 LAB — B-HEM STREP SPEC QL CULT: ABNORMAL

## 2023-11-28 ENCOUNTER — APPOINTMENT (OUTPATIENT)
Dept: ANTEPARTUM | Facility: CLINIC | Age: 33
End: 2023-11-28
Payer: MEDICAID

## 2023-11-28 ENCOUNTER — APPOINTMENT (OUTPATIENT)
Dept: OBGYN | Facility: CLINIC | Age: 33
End: 2023-11-28
Payer: MEDICAID

## 2023-11-28 ENCOUNTER — ASOB RESULT (OUTPATIENT)
Age: 33
End: 2023-11-28

## 2023-11-28 VITALS
BODY MASS INDEX: 39.56 KG/M2 | HEIGHT: 62 IN | WEIGHT: 215 LBS | SYSTOLIC BLOOD PRESSURE: 114 MMHG | DIASTOLIC BLOOD PRESSURE: 69 MMHG

## 2023-11-28 PROCEDURE — 76816 OB US FOLLOW-UP PER FETUS: CPT

## 2023-11-28 PROCEDURE — 99213 OFFICE O/P EST LOW 20 MIN: CPT | Mod: TH

## 2023-11-28 PROCEDURE — 76819 FETAL BIOPHYS PROFIL W/O NST: CPT | Mod: 59

## 2023-12-01 ENCOUNTER — OUTPATIENT (OUTPATIENT)
Dept: OUTPATIENT SERVICES | Facility: HOSPITAL | Age: 33
LOS: 1 days | End: 2023-12-01

## 2023-12-01 VITALS
RESPIRATION RATE: 16 BRPM | DIASTOLIC BLOOD PRESSURE: 66 MMHG | TEMPERATURE: 98 F | HEART RATE: 97 BPM | OXYGEN SATURATION: 98 % | SYSTOLIC BLOOD PRESSURE: 101 MMHG

## 2023-12-01 DIAGNOSIS — O34.211 MATERNAL CARE FOR LOW TRANSVERSE SCAR FROM PREVIOUS CESAREAN DELIVERY: ICD-10-CM

## 2023-12-01 DIAGNOSIS — Z98.890 OTHER SPECIFIED POSTPROCEDURAL STATES: Chronic | ICD-10-CM

## 2023-12-01 DIAGNOSIS — Z98.891 HISTORY OF UTERINE SCAR FROM PREVIOUS SURGERY: ICD-10-CM

## 2023-12-01 DIAGNOSIS — Z98.891 HISTORY OF UTERINE SCAR FROM PREVIOUS SURGERY: Chronic | ICD-10-CM

## 2023-12-01 LAB
ANION GAP SERPL CALC-SCNC: 14 MMOL/L — SIGNIFICANT CHANGE UP (ref 7–14)
ANION GAP SERPL CALC-SCNC: 14 MMOL/L — SIGNIFICANT CHANGE UP (ref 7–14)
APPEARANCE UR: CLEAR — SIGNIFICANT CHANGE UP
APPEARANCE UR: CLEAR — SIGNIFICANT CHANGE UP
BILIRUB UR-MCNC: NEGATIVE — SIGNIFICANT CHANGE UP
BILIRUB UR-MCNC: NEGATIVE — SIGNIFICANT CHANGE UP
BLD GP AB SCN SERPL QL: NEGATIVE — SIGNIFICANT CHANGE UP
BLD GP AB SCN SERPL QL: NEGATIVE — SIGNIFICANT CHANGE UP
BUN SERPL-MCNC: 8 MG/DL — SIGNIFICANT CHANGE UP (ref 7–23)
BUN SERPL-MCNC: 8 MG/DL — SIGNIFICANT CHANGE UP (ref 7–23)
CALCIUM SERPL-MCNC: 9.4 MG/DL — SIGNIFICANT CHANGE UP (ref 8.4–10.5)
CALCIUM SERPL-MCNC: 9.4 MG/DL — SIGNIFICANT CHANGE UP (ref 8.4–10.5)
CHLORIDE SERPL-SCNC: 103 MMOL/L — SIGNIFICANT CHANGE UP (ref 98–107)
CHLORIDE SERPL-SCNC: 103 MMOL/L — SIGNIFICANT CHANGE UP (ref 98–107)
CO2 SERPL-SCNC: 19 MMOL/L — LOW (ref 22–31)
CO2 SERPL-SCNC: 19 MMOL/L — LOW (ref 22–31)
COLOR SPEC: YELLOW — SIGNIFICANT CHANGE UP
COLOR SPEC: YELLOW — SIGNIFICANT CHANGE UP
CREAT SERPL-MCNC: 0.46 MG/DL — LOW (ref 0.5–1.3)
CREAT SERPL-MCNC: 0.46 MG/DL — LOW (ref 0.5–1.3)
DIFF PNL FLD: NEGATIVE — SIGNIFICANT CHANGE UP
DIFF PNL FLD: NEGATIVE — SIGNIFICANT CHANGE UP
EGFR: 128 ML/MIN/1.73M2 — SIGNIFICANT CHANGE UP
EGFR: 128 ML/MIN/1.73M2 — SIGNIFICANT CHANGE UP
GLUCOSE SERPL-MCNC: 88 MG/DL — SIGNIFICANT CHANGE UP (ref 70–99)
GLUCOSE SERPL-MCNC: 88 MG/DL — SIGNIFICANT CHANGE UP (ref 70–99)
GLUCOSE UR QL: NEGATIVE MG/DL — SIGNIFICANT CHANGE UP
GLUCOSE UR QL: NEGATIVE MG/DL — SIGNIFICANT CHANGE UP
HCT VFR BLD CALC: 35.9 % — SIGNIFICANT CHANGE UP (ref 34.5–45)
HCT VFR BLD CALC: 35.9 % — SIGNIFICANT CHANGE UP (ref 34.5–45)
HGB BLD-MCNC: 12.3 G/DL — SIGNIFICANT CHANGE UP (ref 11.5–15.5)
HGB BLD-MCNC: 12.3 G/DL — SIGNIFICANT CHANGE UP (ref 11.5–15.5)
KETONES UR-MCNC: ABNORMAL MG/DL
KETONES UR-MCNC: ABNORMAL MG/DL
LEUKOCYTE ESTERASE UR-ACNC: NEGATIVE — SIGNIFICANT CHANGE UP
LEUKOCYTE ESTERASE UR-ACNC: NEGATIVE — SIGNIFICANT CHANGE UP
MCHC RBC-ENTMCNC: 28.7 PG — SIGNIFICANT CHANGE UP (ref 27–34)
MCHC RBC-ENTMCNC: 28.7 PG — SIGNIFICANT CHANGE UP (ref 27–34)
MCHC RBC-ENTMCNC: 34.3 GM/DL — SIGNIFICANT CHANGE UP (ref 32–36)
MCHC RBC-ENTMCNC: 34.3 GM/DL — SIGNIFICANT CHANGE UP (ref 32–36)
MCV RBC AUTO: 83.9 FL — SIGNIFICANT CHANGE UP (ref 80–100)
MCV RBC AUTO: 83.9 FL — SIGNIFICANT CHANGE UP (ref 80–100)
NITRITE UR-MCNC: NEGATIVE — SIGNIFICANT CHANGE UP
NITRITE UR-MCNC: NEGATIVE — SIGNIFICANT CHANGE UP
NRBC # BLD: 0 /100 WBCS — SIGNIFICANT CHANGE UP (ref 0–0)
NRBC # BLD: 0 /100 WBCS — SIGNIFICANT CHANGE UP (ref 0–0)
NRBC # FLD: 0 K/UL — SIGNIFICANT CHANGE UP (ref 0–0)
NRBC # FLD: 0 K/UL — SIGNIFICANT CHANGE UP (ref 0–0)
PH UR: 6.5 — SIGNIFICANT CHANGE UP (ref 5–8)
PH UR: 6.5 — SIGNIFICANT CHANGE UP (ref 5–8)
PLATELET # BLD AUTO: 301 K/UL — SIGNIFICANT CHANGE UP (ref 150–400)
PLATELET # BLD AUTO: 301 K/UL — SIGNIFICANT CHANGE UP (ref 150–400)
POTASSIUM SERPL-MCNC: 3.8 MMOL/L — SIGNIFICANT CHANGE UP (ref 3.5–5.3)
POTASSIUM SERPL-MCNC: 3.8 MMOL/L — SIGNIFICANT CHANGE UP (ref 3.5–5.3)
POTASSIUM SERPL-SCNC: 3.8 MMOL/L — SIGNIFICANT CHANGE UP (ref 3.5–5.3)
POTASSIUM SERPL-SCNC: 3.8 MMOL/L — SIGNIFICANT CHANGE UP (ref 3.5–5.3)
PROT UR-MCNC: NEGATIVE MG/DL — SIGNIFICANT CHANGE UP
PROT UR-MCNC: NEGATIVE MG/DL — SIGNIFICANT CHANGE UP
RBC # BLD: 4.28 M/UL — SIGNIFICANT CHANGE UP (ref 3.8–5.2)
RBC # BLD: 4.28 M/UL — SIGNIFICANT CHANGE UP (ref 3.8–5.2)
RBC # FLD: 13.3 % — SIGNIFICANT CHANGE UP (ref 10.3–14.5)
RBC # FLD: 13.3 % — SIGNIFICANT CHANGE UP (ref 10.3–14.5)
RH IG SCN BLD-IMP: POSITIVE — SIGNIFICANT CHANGE UP
RH IG SCN BLD-IMP: POSITIVE — SIGNIFICANT CHANGE UP
SODIUM SERPL-SCNC: 136 MMOL/L — SIGNIFICANT CHANGE UP (ref 135–145)
SODIUM SERPL-SCNC: 136 MMOL/L — SIGNIFICANT CHANGE UP (ref 135–145)
SP GR SPEC: 1.01 — SIGNIFICANT CHANGE UP (ref 1–1.03)
SP GR SPEC: 1.01 — SIGNIFICANT CHANGE UP (ref 1–1.03)
UROBILINOGEN FLD QL: 0.2 MG/DL — SIGNIFICANT CHANGE UP (ref 0.2–1)
UROBILINOGEN FLD QL: 0.2 MG/DL — SIGNIFICANT CHANGE UP (ref 0.2–1)
WBC # BLD: 8.74 K/UL — SIGNIFICANT CHANGE UP (ref 3.8–10.5)
WBC # BLD: 8.74 K/UL — SIGNIFICANT CHANGE UP (ref 3.8–10.5)
WBC # FLD AUTO: 8.74 K/UL — SIGNIFICANT CHANGE UP (ref 3.8–10.5)
WBC # FLD AUTO: 8.74 K/UL — SIGNIFICANT CHANGE UP (ref 3.8–10.5)

## 2023-12-01 RX ORDER — OXYTOCIN 10 UNIT/ML
333.33 VIAL (ML) INJECTION
Qty: 20 | Refills: 0 | Status: COMPLETED | OUTPATIENT
Start: 2023-12-08 | End: 2023-12-08

## 2023-12-01 RX ORDER — SODIUM CHLORIDE 9 MG/ML
1000 INJECTION, SOLUTION INTRAVENOUS
Refills: 0 | Status: DISCONTINUED | OUTPATIENT
Start: 2023-12-08 | End: 2023-12-09

## 2023-12-01 RX ORDER — SODIUM CHLORIDE 9 MG/ML
1000 INJECTION, SOLUTION INTRAVENOUS ONCE
Refills: 0 | Status: DISCONTINUED | OUTPATIENT
Start: 2023-12-08 | End: 2023-12-09

## 2023-12-01 NOTE — OB PST NOTE - NSHPREVIEWOFSYSTEMS_GEN_ALL_CORE
General: No fever, chills, sweating, anorexia, weight loss or weight gain. No polyphagia, polyurea, polydypsia, malaise, or fatigue    Skin: No rashes, itching, or dryness. No change in size/color of moles. No tumors, brittle nails, pitted nails, or hair loss    Breast: No tenderness, lumps, or nipple discharge      Ophthalmologic: No diplopia, photophobia, lacrimation, blurred Vision , or eye discharge    ENMT Symptoms: No hearing difficulty, ear pain, tinnitus, or vertigo. No sinus symptoms, nasal congestion, nasal   discharge, or nasal obstruction    Respiratory and Thorax: No wheezing, dyspnea, cough, hemoptysis, or pleuritic chest pPain     Cardiovascular: No chest pain, palpitations, dyspnea on exertion, orthopnea, paroxysmal nocturnal dyspnea,   peripheral edema, or claudication    Gastrointestinal: No nausea, vomiting, diarrhea, constipation, change in bowel habits, flatulence, abdominal pain, or melena    Genitourinary/ Pelvis: No hematuria, renal colic, or flank pain.  No urine discoloration, incontinence, dysuria, or urinary hesitancy. Normal urinary frequency. No nocturia, abnormal vaginal bleeding, vaginal discharge, spotting, pelvic pain, or vaginal leakage    Musculoskeletal: No arthralgia, arthritis, joint swelling, muscle cramping, muscle weakness, neck pain, arm pain, or leg pain    Neurological: No transient paralysis, weakness, paresthesias, or seizures. No syncope, tremors, vertigo, loss of sensation, difficulty walking, loss of consciousness, hemiparesis, confusion, or facial palsy    Psychiatric: No suicidal ideation, depression, anxiety, insomnia, memory loss, paranoia, mood swings, agitation, hallucinations, or hyperactivity    Hematology: No gum bleeding, nose bleeding, or skin lumps    Lymphatic: No enlarged or tender lymph nodes. No extremity swelling    Endocrine: No heat or cold intolerance    Immunologic: No recurrent or persistent infections General: No fever, chills,   Skin: No rashes, itching, or dryness.   Breast: Normal   Ophthalmologic: No diplopia,   ENMT Symptoms: No hearing difficulty, ear pain, tinnitus, or vertigo. No sinus symptoms, nasal congestion, nasal   discharge, or nasal obstruction    Respiratory and Thorax: No wheezing, dyspnea, cough,  Cardiovascular: No chest pain, palpitations,     Gastrointestinal: No nausea, vomiting, diarrhea, constipation,   Genitourinary/ Pelvis: No hematuria, Normal urinary frequency. No  abnormal vaginal bleeding,   Musculoskeletal: No arthralgia, arthritis,   Neurological: No transient paralysis, weakness, paresthesias, or seizures.     Psychiatric: No suicidal ideation, depression, anxiety,   Hematology: No gum bleeding, nose bleeding, or skin lumps    Lymphatic: No enlarged or tender lymph nodes. No extremity swelling    Endocrine: No heat or cold intolerance    Immunologic: No recurrent or persistent infections

## 2023-12-01 NOTE — OB PST NOTE - PROBLEM SELECTOR PLAN 1
Pt scheduled for surgery and preop instructions including instructions  and for Chlorhexidine use in showering on the day of surgery, given verbally and with use of  written materials, and patient confirming understanding of such instructions using  teach back method.

## 2023-12-01 NOTE — OB PST NOTE - HISTORY OF PRESENT ILLNESS
Pt is a 33 yr old female scheduled for Repeat  with Dr Hillman tentatively 23 - pt gestation 38 weeks,  -   for mother's HTN, and  for preference - pt now denies GDM or HTN - no vaginal bleeding or contractions - EDINSON 23  Pt is a 33 yr old female scheduled for Repeat  B/L Tubal Ligation  with Dr Hillman tentatively 23 - pt gestation 38 weeks,  -   for mother's HTN, and  for preference - pt now denies GDM or HTN - no vaginal bleeding or contractions - EDINSON 23 Last seen Monday by OB and fetal heartbeat and activity monitored - pt confirms fetal movement today

## 2023-12-01 NOTE — OB PST NOTE - NSHPPHYSICALEXAM_GEN_ALL_CORE
Constitutional: Well Developed, Well Groomed, Well Nourished, No Distress    Eyes: PERRL, EOMI, conjunctiva clear    Ears: Normal    Mouth & Gums: Normal, moist    Pharynx: No tenderness, discharge, or peritonsillar abscess    Tonsils: No Redness, discharge, tenderness, or swelling    Neck: Supple, no JVD, normal thyroid glands, no carotid bruits, no cervical vertebral or paraspinal tenderness    Breast: Normal shape, no masses, no tenderness, nipples normal, no nipple discharge    Back: Normal shape, ROM intact, strength intact, no vertebral tenderness    Respiratory: Airway patent, breath sounds equal, good air movement, respiration non-labored, clear to auscultation bilateral, no chest wall tenderness, no intercostal retractions, no rales, no wheezes, no rhonchi, no subcutaneous emphysema    Cardiovascular:  Regular rate and rhythm, no rubs or murmur, normal PMI    Gastrointestinal: Soft, non-tender, non distention, no masses palpable, bowel sound normal, no bruit, no rebound tenderness    Extremities: No clubbing, cyanosis, or pedal edema    Vascular:  Carotid Pulse normal , Radial Pulse normal, Femoral Pulse normal, DP pulse normal, PT pulse normal    Neurological: alert & oriented x 3, sensation intact, deep reflexes intact, cranial nerve intact, normal strength    Skin: warm and dry, normal color    Lymph Nodes: normal posterior cervical lymph node, normal anterior cervical lymph node, normal supraclavicular lymph node, normal axillary lymph node, normal inguinal lymph node, normal femoral lymph node    Musculoskeletal: ROM intact, no joint swelling, warmth, or calf tenderness. Normal strength    Psychiatric: normal affect, normal behavior Constitutional: Well Developed, Well Groomed, Well Nourished, No Distress    Eyes: PERRL, EOMI, conjunctiva clear    Ears: Normal    Mouth & Gums: Normal, moist    Neck: Supple, no JVD,   Breast: Normal shape,   Back: Normal shape, ROM intact, strength intact,  Respiratory: Airway patent, breath sounds equal, good air movement, respiration non-labored, clear to auscultation bilateral,     Cardiovascular:  Regular rate and rhythm,   Gastrointestinal:   Extremities: Pt wearing compression stockings - c/o mild swelling   Vascular:   Radial Pulse normal,   Neurological: alert & oriented x 3,   Skin: warm and dry, normal color    Lymph Nodes: normal posterior cervical lymph node, normal anterior cervical lymph node, normal supraclavicular lymph node,     Musculoskeletal: ROM intact, no joint swelling, warmth, or calf tenderness. Normal strength    Psychiatric: normal affect, normal behavior

## 2023-12-01 NOTE — OB PST NOTE - NSICDXPASTMEDICALHX_GEN_ALL_CORE_FT
PAST MEDICAL HISTORY:  Anxiety on no meds    HSV-1 infection     Ovarian cyst     Rheumatoid arthritis On Planquinil for last few months    Rheumatoid arthritis      PAST MEDICAL HISTORY:  Anxiety on no meds    HSV-1 infection     Ovarian cyst     Pregnancy     Rheumatoid arthritis On Planquinil for last few months    Rheumatoid arthritis

## 2023-12-01 NOTE — OB PST NOTE - AIRWAY
normal O-Z Plasty Text: The defect edges were debeveled with a #15 scalpel blade.  Given the location of the defect, shape of the defect and the proximity to free margins an O-Z plasty (double transposition flap) was deemed most appropriate.  Using a sterile surgical marker, the appropriate transposition flaps were drawn incorporating the defect and placing the expected incisions within the relaxed skin tension lines where possible.    The area thus outlined was incised deep to adipose tissue with a #15 scalpel blade.  The skin margins were undermined to an appropriate distance in all directions utilizing iris scissors.  Hemostasis was achieved with electrocautery.  The flaps were then transposed into place, one clockwise and the other counterclockwise, and anchored with interrupted buried subcutaneous sutures.

## 2023-12-05 ENCOUNTER — APPOINTMENT (OUTPATIENT)
Dept: OBGYN | Facility: CLINIC | Age: 33
End: 2023-12-05
Payer: MEDICAID

## 2023-12-05 VITALS
WEIGHT: 217 LBS | SYSTOLIC BLOOD PRESSURE: 127 MMHG | DIASTOLIC BLOOD PRESSURE: 79 MMHG | BODY MASS INDEX: 39.93 KG/M2 | HEIGHT: 62 IN

## 2023-12-05 PROCEDURE — 99213 OFFICE O/P EST LOW 20 MIN: CPT | Mod: TH

## 2023-12-07 ENCOUNTER — TRANSCRIPTION ENCOUNTER (OUTPATIENT)
Age: 33
End: 2023-12-07

## 2023-12-08 ENCOUNTER — TRANSCRIPTION ENCOUNTER (OUTPATIENT)
Age: 33
End: 2023-12-08

## 2023-12-08 ENCOUNTER — APPOINTMENT (OUTPATIENT)
Dept: OBGYN | Facility: HOSPITAL | Age: 33
End: 2023-12-08

## 2023-12-08 ENCOUNTER — INPATIENT (INPATIENT)
Facility: HOSPITAL | Age: 33
LOS: 1 days | Discharge: HOME | End: 2023-12-10
Attending: OBSTETRICS & GYNECOLOGY | Admitting: OBSTETRICS & GYNECOLOGY
Payer: MEDICAID

## 2023-12-08 ENCOUNTER — RESULT REVIEW (OUTPATIENT)
Age: 33
End: 2023-12-08

## 2023-12-08 VITALS — DIASTOLIC BLOOD PRESSURE: 67 MMHG | TEMPERATURE: 98 F | HEART RATE: 100 BPM | SYSTOLIC BLOOD PRESSURE: 121 MMHG

## 2023-12-08 DIAGNOSIS — O34.211 MATERNAL CARE FOR LOW TRANSVERSE SCAR FROM PREVIOUS CESAREAN DELIVERY: ICD-10-CM

## 2023-12-08 DIAGNOSIS — Z98.890 OTHER SPECIFIED POSTPROCEDURAL STATES: Chronic | ICD-10-CM

## 2023-12-08 DIAGNOSIS — Z98.891 HISTORY OF UTERINE SCAR FROM PREVIOUS SURGERY: Chronic | ICD-10-CM

## 2023-12-08 LAB
BASOPHILS # BLD AUTO: 0.04 K/UL — SIGNIFICANT CHANGE UP (ref 0–0.2)
BASOPHILS # BLD AUTO: 0.04 K/UL — SIGNIFICANT CHANGE UP (ref 0–0.2)
BASOPHILS NFR BLD AUTO: 0.4 % — SIGNIFICANT CHANGE UP (ref 0–2)
BASOPHILS NFR BLD AUTO: 0.4 % — SIGNIFICANT CHANGE UP (ref 0–2)
BLD GP AB SCN SERPL QL: NEGATIVE — SIGNIFICANT CHANGE UP
BLD GP AB SCN SERPL QL: NEGATIVE — SIGNIFICANT CHANGE UP
EOSINOPHIL # BLD AUTO: 0.08 K/UL — SIGNIFICANT CHANGE UP (ref 0–0.5)
EOSINOPHIL # BLD AUTO: 0.08 K/UL — SIGNIFICANT CHANGE UP (ref 0–0.5)
EOSINOPHIL NFR BLD AUTO: 0.7 % — SIGNIFICANT CHANGE UP (ref 0–6)
EOSINOPHIL NFR BLD AUTO: 0.7 % — SIGNIFICANT CHANGE UP (ref 0–6)
HCT VFR BLD CALC: 38.5 % — SIGNIFICANT CHANGE UP (ref 34.5–45)
HCT VFR BLD CALC: 38.5 % — SIGNIFICANT CHANGE UP (ref 34.5–45)
HGB BLD-MCNC: 13.1 G/DL — SIGNIFICANT CHANGE UP (ref 11.5–15.5)
HGB BLD-MCNC: 13.1 G/DL — SIGNIFICANT CHANGE UP (ref 11.5–15.5)
IANC: 7.61 K/UL — HIGH (ref 1.8–7.4)
IANC: 7.61 K/UL — HIGH (ref 1.8–7.4)
IMM GRANULOCYTES NFR BLD AUTO: 0.7 % — SIGNIFICANT CHANGE UP (ref 0–0.9)
IMM GRANULOCYTES NFR BLD AUTO: 0.7 % — SIGNIFICANT CHANGE UP (ref 0–0.9)
LYMPHOCYTES # BLD AUTO: 18.7 % — SIGNIFICANT CHANGE UP (ref 13–44)
LYMPHOCYTES # BLD AUTO: 18.7 % — SIGNIFICANT CHANGE UP (ref 13–44)
LYMPHOCYTES # BLD AUTO: 2 K/UL — SIGNIFICANT CHANGE UP (ref 1–3.3)
LYMPHOCYTES # BLD AUTO: 2 K/UL — SIGNIFICANT CHANGE UP (ref 1–3.3)
MCHC RBC-ENTMCNC: 28.2 PG — SIGNIFICANT CHANGE UP (ref 27–34)
MCHC RBC-ENTMCNC: 28.2 PG — SIGNIFICANT CHANGE UP (ref 27–34)
MCHC RBC-ENTMCNC: 34 GM/DL — SIGNIFICANT CHANGE UP (ref 32–36)
MCHC RBC-ENTMCNC: 34 GM/DL — SIGNIFICANT CHANGE UP (ref 32–36)
MCV RBC AUTO: 82.8 FL — SIGNIFICANT CHANGE UP (ref 80–100)
MCV RBC AUTO: 82.8 FL — SIGNIFICANT CHANGE UP (ref 80–100)
MONOCYTES # BLD AUTO: 0.92 K/UL — HIGH (ref 0–0.9)
MONOCYTES # BLD AUTO: 0.92 K/UL — HIGH (ref 0–0.9)
MONOCYTES NFR BLD AUTO: 8.6 % — SIGNIFICANT CHANGE UP (ref 2–14)
MONOCYTES NFR BLD AUTO: 8.6 % — SIGNIFICANT CHANGE UP (ref 2–14)
NEUTROPHILS # BLD AUTO: 7.61 K/UL — HIGH (ref 1.8–7.4)
NEUTROPHILS # BLD AUTO: 7.61 K/UL — HIGH (ref 1.8–7.4)
NEUTROPHILS NFR BLD AUTO: 70.9 % — SIGNIFICANT CHANGE UP (ref 43–77)
NEUTROPHILS NFR BLD AUTO: 70.9 % — SIGNIFICANT CHANGE UP (ref 43–77)
NRBC # BLD: 0 /100 WBCS — SIGNIFICANT CHANGE UP (ref 0–0)
NRBC # BLD: 0 /100 WBCS — SIGNIFICANT CHANGE UP (ref 0–0)
NRBC # FLD: 0 K/UL — SIGNIFICANT CHANGE UP (ref 0–0)
NRBC # FLD: 0 K/UL — SIGNIFICANT CHANGE UP (ref 0–0)
PLATELET # BLD AUTO: 296 K/UL — SIGNIFICANT CHANGE UP (ref 150–400)
PLATELET # BLD AUTO: 296 K/UL — SIGNIFICANT CHANGE UP (ref 150–400)
RBC # BLD: 4.65 M/UL — SIGNIFICANT CHANGE UP (ref 3.8–5.2)
RBC # BLD: 4.65 M/UL — SIGNIFICANT CHANGE UP (ref 3.8–5.2)
RBC # FLD: 13.1 % — SIGNIFICANT CHANGE UP (ref 10.3–14.5)
RBC # FLD: 13.1 % — SIGNIFICANT CHANGE UP (ref 10.3–14.5)
RH IG SCN BLD-IMP: POSITIVE — SIGNIFICANT CHANGE UP
RH IG SCN BLD-IMP: POSITIVE — SIGNIFICANT CHANGE UP
T PALLIDUM AB TITR SER: NEGATIVE — SIGNIFICANT CHANGE UP
T PALLIDUM AB TITR SER: NEGATIVE — SIGNIFICANT CHANGE UP
WBC # BLD: 10.72 K/UL — HIGH (ref 3.8–10.5)
WBC # BLD: 10.72 K/UL — HIGH (ref 3.8–10.5)
WBC # FLD AUTO: 10.72 K/UL — HIGH (ref 3.8–10.5)
WBC # FLD AUTO: 10.72 K/UL — HIGH (ref 3.8–10.5)

## 2023-12-08 PROCEDURE — 71046 X-RAY EXAM CHEST 2 VIEWS: CPT | Mod: 26

## 2023-12-08 PROCEDURE — 59514 CESAREAN DELIVERY ONLY: CPT | Mod: U9

## 2023-12-08 PROCEDURE — 88302 TISSUE EXAM BY PATHOLOGIST: CPT | Mod: 26

## 2023-12-08 PROCEDURE — 58700 REMOVAL OF FALLOPIAN TUBE: CPT

## 2023-12-08 DEVICE — SURGICEL SNOW 2 X 4": Type: IMPLANTABLE DEVICE | Status: FUNCTIONAL

## 2023-12-08 DEVICE — SURGICEL FIBRILLAR 1 X 2": Type: IMPLANTABLE DEVICE | Status: FUNCTIONAL

## 2023-12-08 RX ORDER — OXYCODONE HYDROCHLORIDE 5 MG/1
5 TABLET ORAL
Refills: 0 | Status: COMPLETED | OUTPATIENT
Start: 2023-12-08 | End: 2023-12-15

## 2023-12-08 RX ORDER — ONDANSETRON 8 MG/1
4 TABLET, FILM COATED ORAL EVERY 6 HOURS
Refills: 0 | Status: DISCONTINUED | OUTPATIENT
Start: 2023-12-08 | End: 2023-12-09

## 2023-12-08 RX ORDER — IBUPROFEN 200 MG
600 TABLET ORAL EVERY 6 HOURS
Refills: 0 | Status: COMPLETED | OUTPATIENT
Start: 2023-12-08 | End: 2024-11-05

## 2023-12-08 RX ORDER — MORPHINE SULFATE 50 MG/1
0.1 CAPSULE, EXTENDED RELEASE ORAL ONCE
Refills: 0 | Status: DISCONTINUED | OUTPATIENT
Start: 2023-12-08 | End: 2023-12-09

## 2023-12-08 RX ORDER — DIPHENHYDRAMINE HCL 50 MG
25 CAPSULE ORAL EVERY 6 HOURS
Refills: 0 | Status: DISCONTINUED | OUTPATIENT
Start: 2023-12-08 | End: 2023-12-10

## 2023-12-08 RX ORDER — FAMOTIDINE 10 MG/ML
20 INJECTION INTRAVENOUS ONCE
Refills: 0 | Status: COMPLETED | OUTPATIENT
Start: 2023-12-08 | End: 2023-12-08

## 2023-12-08 RX ORDER — ACETAMINOPHEN 500 MG
1000 TABLET ORAL ONCE
Refills: 0 | Status: COMPLETED | OUTPATIENT
Start: 2023-12-08 | End: 2023-12-08

## 2023-12-08 RX ORDER — TETANUS TOXOID, REDUCED DIPHTHERIA TOXOID AND ACELLULAR PERTUSSIS VACCINE, ADSORBED 5; 2.5; 8; 8; 2.5 [IU]/.5ML; [IU]/.5ML; UG/.5ML; UG/.5ML; UG/.5ML
0.5 SUSPENSION INTRAMUSCULAR ONCE
Refills: 0 | Status: DISCONTINUED | OUTPATIENT
Start: 2023-12-08 | End: 2023-12-10

## 2023-12-08 RX ORDER — HEPARIN SODIUM 5000 [USP'U]/ML
10000 INJECTION INTRAVENOUS; SUBCUTANEOUS EVERY 12 HOURS
Refills: 0 | Status: DISCONTINUED | OUTPATIENT
Start: 2023-12-08 | End: 2023-12-10

## 2023-12-08 RX ORDER — INFLUENZA VIRUS VACCINE 15; 15; 15; 15 UG/.5ML; UG/.5ML; UG/.5ML; UG/.5ML
0.5 SUSPENSION INTRAMUSCULAR ONCE
Refills: 0 | Status: DISCONTINUED | OUTPATIENT
Start: 2023-12-08 | End: 2023-12-10

## 2023-12-08 RX ORDER — VALACYCLOVIR 500 MG/1
500 TABLET, FILM COATED ORAL ONCE
Refills: 0 | Status: DISCONTINUED | OUTPATIENT
Start: 2023-12-08 | End: 2023-12-08

## 2023-12-08 RX ORDER — LANOLIN
1 OINTMENT (GRAM) TOPICAL EVERY 6 HOURS
Refills: 0 | Status: DISCONTINUED | OUTPATIENT
Start: 2023-12-08 | End: 2023-12-10

## 2023-12-08 RX ORDER — SIMETHICONE 80 MG/1
80 TABLET, CHEWABLE ORAL EVERY 4 HOURS
Refills: 0 | Status: DISCONTINUED | OUTPATIENT
Start: 2023-12-08 | End: 2023-12-10

## 2023-12-08 RX ORDER — HYDROXYCHLOROQUINE SULFATE 200 MG
200 TABLET ORAL
Refills: 0 | Status: DISCONTINUED | OUTPATIENT
Start: 2023-12-08 | End: 2023-12-10

## 2023-12-08 RX ORDER — OXYCODONE HYDROCHLORIDE 5 MG/1
5 TABLET ORAL ONCE
Refills: 0 | Status: DISCONTINUED | OUTPATIENT
Start: 2023-12-08 | End: 2023-12-10

## 2023-12-08 RX ORDER — ACETAMINOPHEN 500 MG
975 TABLET ORAL EVERY 6 HOURS
Refills: 0 | Status: DISCONTINUED | OUTPATIENT
Start: 2023-12-08 | End: 2023-12-10

## 2023-12-08 RX ORDER — VALACYCLOVIR 500 MG/1
500 TABLET, FILM COATED ORAL ONCE
Refills: 0 | Status: COMPLETED | OUTPATIENT
Start: 2023-12-08 | End: 2023-12-08

## 2023-12-08 RX ORDER — DEXAMETHASONE 0.5 MG/5ML
4 ELIXIR ORAL EVERY 6 HOURS
Refills: 0 | Status: DISCONTINUED | OUTPATIENT
Start: 2023-12-08 | End: 2023-12-09

## 2023-12-08 RX ORDER — KETOROLAC TROMETHAMINE 30 MG/ML
30 SYRINGE (ML) INJECTION EVERY 6 HOURS
Refills: 0 | Status: DISCONTINUED | OUTPATIENT
Start: 2023-12-08 | End: 2023-12-09

## 2023-12-08 RX ORDER — SODIUM CHLORIDE 9 MG/ML
500 INJECTION, SOLUTION INTRAVENOUS ONCE
Refills: 0 | Status: COMPLETED | OUTPATIENT
Start: 2023-12-08 | End: 2023-12-08

## 2023-12-08 RX ORDER — CITRIC ACID/SODIUM CITRATE 300-500 MG
30 SOLUTION, ORAL ORAL ONCE
Refills: 0 | Status: COMPLETED | OUTPATIENT
Start: 2023-12-08 | End: 2023-12-08

## 2023-12-08 RX ORDER — NALOXONE HYDROCHLORIDE 4 MG/.1ML
0.1 SPRAY NASAL
Refills: 0 | Status: DISCONTINUED | OUTPATIENT
Start: 2023-12-08 | End: 2023-12-09

## 2023-12-08 RX ORDER — MAGNESIUM HYDROXIDE 400 MG/1
30 TABLET, CHEWABLE ORAL
Refills: 0 | Status: DISCONTINUED | OUTPATIENT
Start: 2023-12-08 | End: 2023-12-10

## 2023-12-08 RX ADMIN — SODIUM CHLORIDE 75 MILLILITER(S): 9 INJECTION, SOLUTION INTRAVENOUS at 15:10

## 2023-12-08 RX ADMIN — Medication 30 MILLILITER(S): at 07:08

## 2023-12-08 RX ADMIN — Medication 30 MILLIGRAM(S): at 22:10

## 2023-12-08 RX ADMIN — Medication 975 MILLIGRAM(S): at 18:34

## 2023-12-08 RX ADMIN — HEPARIN SODIUM 10000 UNIT(S): 5000 INJECTION INTRAVENOUS; SUBCUTANEOUS at 15:15

## 2023-12-08 RX ADMIN — Medication 975 MILLIGRAM(S): at 18:17

## 2023-12-08 RX ADMIN — Medication 30 MILLIGRAM(S): at 15:11

## 2023-12-08 RX ADMIN — SODIUM CHLORIDE 125 MILLILITER(S): 9 INJECTION, SOLUTION INTRAVENOUS at 07:08

## 2023-12-08 RX ADMIN — Medication 400 MILLIGRAM(S): at 12:15

## 2023-12-08 RX ADMIN — VALACYCLOVIR 500 MILLIGRAM(S): 500 TABLET, FILM COATED ORAL at 21:54

## 2023-12-08 RX ADMIN — FAMOTIDINE 20 MILLIGRAM(S): 10 INJECTION INTRAVENOUS at 07:08

## 2023-12-08 RX ADMIN — Medication 30 MILLIGRAM(S): at 21:09

## 2023-12-08 RX ADMIN — Medication 1000 MILLIUNIT(S)/MIN: at 15:10

## 2023-12-08 RX ADMIN — SODIUM CHLORIDE 500 MILLILITER(S): 9 INJECTION, SOLUTION INTRAVENOUS at 11:10

## 2023-12-08 NOTE — OB RN PATIENT PROFILE - FALL HARM RISK - HARM RISK INTERVENTIONS
Communicate Risk of Fall with Harm to all staff/Reinforce activity limits and safety measures with patient and family/Tailored Fall Risk Interventions/Visual Cue: Yellow wristband and red socks/Bed in lowest position, wheels locked, appropriate side rails in place/Call bell, personal items and telephone in reach/Instruct patient to call for assistance before getting out of bed or chair/Non-slip footwear when patient is out of bed/[place] to call system/Physically safe environment - no spills, clutter or unnecessary equipment/Purposeful Proactive Rounding/Room/bathroom lighting operational, light cord in reach Communicate Risk of Fall with Harm to all staff/Reinforce activity limits and safety measures with patient and family/Tailored Fall Risk Interventions/Visual Cue: Yellow wristband and red socks/Bed in lowest position, wheels locked, appropriate side rails in place/Call bell, personal items and telephone in reach/Instruct patient to call for assistance before getting out of bed or chair/Non-slip footwear when patient is out of bed/[place] to call system/Physically safe environment - no spills, clutter or unnecessary equipment/Purposeful Proactive Rounding/Room/bathroom lighting operational, light cord in reach

## 2023-12-08 NOTE — OB PROVIDER DELIVERY SUMMARY - NSPROVIDERDELIVERYNOTE_OBGYN_ALL_OB_FT
rLTCS + BS    Viable female infant. CHRISTAL presentation. 3530g. APGARS 9/9  Omental and filmy adhesions over anterior uterus. Peritoneal fat adhesions over the inferior aspect of the hysterotomy   Grossly normal uterus, bilateral tubes, and ovaries otherwise   Hysterotomy closed in x1 layer w/ 1-0 PDS   - SurgiCell placed over hysterotomy   Rectus muscle reapproximated w/ 2-0 Chromic w/ a horizontal mattress   - SurgiCell powder and fibrillar placed over area near horizontal mattress suture   Fascia reapproximated w/ 0-0 Vicryl in a running fashion   SubQ reapproximated w/ 2-0 plain gut in a running fashion and interrupted fashion in x2 layers   Skin reapproximated w/ 3-0 Monocryl in a subcuticular fashion     331/2000/500    Dictation #: rLTCS + BS    Viable female infant. CHRISTAL presentation. 3530g. APGARS 9/9  Omental and filmy adhesions over anterior uterus. Peritoneal fat adhesions over BRISEIDA  Grossly normal uterus, bilateral tubes, and ovaries otherwise   Hysterotomy closed in x1 layer w/ 1-0 PDS   - SurgiCell placed over hysterotomy   Rectus muscle reapproximated w/ 2-0 Chromic w/ a horizontal mattress   - SurgiCell powder and fibrillar placed over rectus muscle  Fascia reapproximated w/ 0-0 Vicryl in a running fashion   SubQ reapproximated w/ 2-0 plain gut in a running fashion and interrupted fashion in x2 layers   Skin reapproximated w/ 3-0 Monocryl in a subcuticular fashion     331/2000/500    Dictation #: rLTCS + BS    Viable female infant. CHRISTAL presentation. 3530g. APGARS 9/9  Omental and filmy adhesions over anterior uterus. Peritoneal fat adhesions over BRISEIDA  Grossly normal uterus, bilateral tubes, and ovaries otherwise   Hysterotomy closed in x1 layer w/ 1-0 PDS   - SurgiCell placed over hysterotomy   Rectus muscle reapproximated w/ 2-0 Chromic w/ a horizontal mattress   - SurgiCell powder and fibrillar placed over rectus muscle  Fascia reapproximated w/ 0-0 Vicryl in a running fashion   SubQ reapproximated w/ 2-0 plain gut in a running fashion and interrupted fashion in x2 layers   Skin reapproximated w/ 3-0 Monocryl in a subcuticular fashion     331/2000/500    Dictation #: [id number] rLTCS + BS    Viable female infant. CHRISTAL presentation. 3530g. APGARS 9/9  Omental and filmy adhesions over anterior uterus. Peritoneal fat adhesions over BRISEIDA  Grossly normal uterus, bilateral tubes, and ovaries otherwise   Hysterotomy closed in x1 layer w/ 1-0 PDS   - SurgiCell placed over hysterotomy   Rectus muscle reapproximated w/ 2-0 Chromic w/ a horizontal mattress   - SurgiCell powder and fibrillar placed over rectus muscle  Fascia reapproximated w/ 0-0 Vicryl in a running fashion   SubQ reapproximated w/ 2-0 plain gut in a running fashion and interrupted fashion in x2 layers   Skin reapproximated w/ 3-0 Monocryl in a subcuticular fashion     331/2000/500    Dictation #: [id number]

## 2023-12-08 NOTE — OB PROVIDER H&P - PRO PRENATAL LABS ORI SOURCE BLOOD TYPE
Last 7 PHQ 2/9 Test Results  0: Not at all  1: Several days  2: More than half the days  3: Nearly every day       PHQ9 SCREENING FLOWSHEET 10/8/2019   Adult PHQ2 Score 2   Adult PHQ9 Score 5   Little interest or pleasure in activity 1   Feeling down, depressed or hopeless 1   Trouble falling or staying asleep or sleeping all the time 1   Feeling tired or having little energy 1   Poor appetite or overeating 1   Feeling bad about yourself or that you are a failure or have let yourself or family down 0   Trouble concentrating on things such as reading hte newspaper or watching TV 0   Moving or speaking slowly that other people have noticed or the opposite - being so fidgety or restless that you have been moving around a lot more than usual 0   Thoughts that you would be better off dead or of hurting yourself in some way 0     Last four GAD7 Assessments     No flowsheet data found.   hard copy, drawn during this pregnancy

## 2023-12-08 NOTE — DISCHARGE NOTE OB - NS MD DC FALL RISK RISK
For information on Fall & Injury Prevention, visit: [email or web address] OR  [email or web address] OR  https://www.cdc.gov/steadi/patient.html For information on Fall & Injury Prevention, visit: [email or web address] OR  [email or web address] OR  https://www.cdc.gov/steadi/patient.html

## 2023-12-08 NOTE — DISCHARGE NOTE OB - MATERIALS PROVIDED
Vaccinations/[facility]  Screening Program/  Immunization Record/Breastfeeding Log/Bottle Feeding Log/Breastfeeding Mother’s Support Group Information/Guide to Postpartum Care/[facility] Hearing Screen Program/Back To Sleep Handout/Shaken Baby Prevention Handout/Breastfeeding Guide and Packet/Birth Certificate Instructions/Discharge Medication Information for Patients and Families Pocket Guide Vaccinations/[facility]  Screening Program/  Immunization Record/Breastfeeding Log/Bottle Feeding Log/Breastfeeding Mother’s Support Group Information/Guide to Postpartum Care/[facility] Hearing Screen Program/Back To Sleep Handout/Shaken Baby Prevention Handout/Breastfeeding Guide and Packet/Birth Certificate Instructions/Discharge Medication Information for Patients and Families Pocket Guide

## 2023-12-08 NOTE — OB PROVIDER H&P - NS ATTEND AMEND GEN_ALL_CORE FT
OB Attending     32 y/o P2 presenting for scheduled repeat C/S + BS. Accepts blood. written informed consent obtained.     SANTOS Fonseca MD OB Attending     34 y/o P2 presenting for scheduled repeat C/S + BS. Accepts blood. written informed consent obtained.     SANTOS Fonseca MD

## 2023-12-08 NOTE — OB PROVIDER H&P - HISTORY OF PRESENT ILLNESS
32yo  @ 39+1 EFW 8# presents for a scheduled CS  denies ctx, lof, vb & endorses +FM  PNC uncomplicated, accepts blood transfusion    POBHx: 2009 CS - patient states she had elevated BPs, 10/29/2021 CS, SAB x 1   GynHx: ovarian cyst  MedHx: RA on Plaquenil, HSV1   SHx: lymph node removal '17, R knee surgery '19  Social: anxiety - no meds, has therapist  NKDA  Meds: Plaquenil 200 BID    VS  T(C): 36.8 (23 @ 06:25)  HR: 100 (23 @ 06:25)  BP: 121/67 (23 @ 06:25)  RR: 16 (23 @ 06:25)  SpO2: -- 34yo  @ 39+1 EFW 8# presents for a scheduled CS  denies ctx, lof, vb & endorses +FM  PNC uncomplicated, accepts blood transfusion    POBHx: 2009 CS - patient states she had elevated BPs & elected for a primary CS 7#3, 10/29/2021 CS 8#, SAB x 1   GynHx: ovarian cyst  MedHx: RA on Plaquenil, HSV1   SHx: lymph node removal '17, R knee surgery '19  Social: anxiety - no meds, has therapist  NKDA  Meds: Plaquenil 200 BID    VS  T(C): 36.8 (23 @ 06:25)  HR: 100 (23 @ 06:25)  BP: 121/67 (23 @ 06:25)  RR: 16 (23 @ 06:25)  SpO2: --

## 2023-12-08 NOTE — DISCHARGE NOTE OB - ADMISSION DATE +STARTOFVISITDATE
Statement Selected [FreeTextEntry1] : CPE [de-identified] : 41 yo Female with PMHx of ?Rhematoid arthritis presents today for a complete physical exam.  Patient mentions she has been gaining weight in the past couple of months and has notice she is losing more hair when she showers.  She denies constipation, cold intolerance, changes in appetite, dry skin, headaches, chest pain or abdominal pain. \par Patient mentions in [place] she was taking a medication for RA, but is not sure the name and will bring it on her next appointment.

## 2023-12-08 NOTE — OB RN PATIENT PROFILE - NSICDXPASTMEDICALHX_GEN_ALL_CORE_FT
PAST MEDICAL HISTORY:  Anxiety on no meds    HSV-1 infection     Ovarian cyst     Pregnancy     Rheumatoid arthritis On Planquinil for last few months    Rheumatoid arthritis

## 2023-12-08 NOTE — DISCHARGE NOTE OB - CARE PROVIDER_API CALL
Sample-Chasidy Solorio  Obstetrics and Gynecology  [address], [address]  [place] [address]  Phone: [phone]  Fax: [phone]  Follow Up Time:    Sample-Chasidy Solorio  Obstetrics and Gynecology  [address], [address]  [place] [address]  Phone: [phone]  Fax: [phone]  Follow Up Time:

## 2023-12-08 NOTE — DISCHARGE NOTE OB - PLAN OF CARE
Routine post operative care  No heavy lifting After discharge, please stay on pelvic rest for 6 weeks, meaning no sexual intercourse, no tampons and no douching.  No driving for 2 weeks as women can loose a lot of blood during delivery and there is a possibility of being lightheaded/fainting.  No lifting objects heavier than baby for two weeks.  Expect to have vaginal bleeding/spotting for up to six weeks.  The bleeding should get lighter and more white/light brown with time.  For bleeding soaking more than a pad an hour or passing clots greater than the size of your fist, come in to the emergency department.    Follow up in OB office in 1-2 weeks for incision check.  Call for noticeable increase in redness or swelling at incision, discharge from incision, or opening of skin at incision site

## 2023-12-08 NOTE — OB RN PATIENT PROFILE - FALL HARM RISK - FALLEN IN PAST
Please stop using anabolic steroids. Return if any worsening symptoms. Please stay hydrated at home.
Accidental fall

## 2023-12-08 NOTE — DISCHARGE NOTE OB - MEDICATION SUMMARY - MEDICATIONS TO TAKE
I will START or STAY ON the medications listed below when I get home from the hospital:    ibuprofen 600 mg oral tablet  -- 1 tab(s) by mouth every 6 hours  -- Indication: For  delivery delivered   I will START or STAY ON the medications listed below when I get home from the hospital:    ibuprofen 600 mg oral tablet  -- 1 tab(s) by mouth every 6 hours as needed for  moderate pain  -- Indication: For Pain    acetaminophen 500 mg oral tablet  -- 2 tab(s) by mouth every 6 hours as needed for  mild pain  -- Indication: For Pain    hydroxychloroquine 200 mg oral tablet  -- 1 tab(s) by mouth 2 times a day  -- Indication: For Rheumatoid arthritis    multivitamin, prenatal  -- 1 tab(s) by mouth once a day  -- Indication: For Vitamin

## 2023-12-08 NOTE — DISCHARGE NOTE OB - PATIENT PORTAL LINK FT
You can access the FollowMyHealth Patient Portal offered by [facility] by registering at the following website: [email or web address]. By joining [employer]’s FollowMyHealth portal, you will also be able to view your health information using other applications (apps) compatible with our system. You can access the FollowMyHealth Patient Portal offered by [facility] by registering at the following website: [email or web address]. By joining [employer]’s FollowMyHealth portal, you will also be able to view your health information using other applications (apps) compatible with our system.

## 2023-12-08 NOTE — DISCHARGE NOTE OB - CARE PLAN
Principal Discharge DX:	 delivery delivered  Assessment and plan of treatment:	Routine post operative care  No heavy lifting   1 Principal Discharge DX:	 delivery delivered  Assessment and plan of treatment:	After discharge, please stay on pelvic rest for 6 weeks, meaning no sexual intercourse, no tampons and no douching.  No driving for 2 weeks as women can loose a lot of blood during delivery and there is a possibility of being lightheaded/fainting.  No lifting objects heavier than baby for two weeks.  Expect to have vaginal bleeding/spotting for up to six weeks.  The bleeding should get lighter and more white/light brown with time.  For bleeding soaking more than a pad an hour or passing clots greater than the size of your fist, come in to the emergency department.    Follow up in OB office in 1-2 weeks for incision check.  Call for noticeable increase in redness or swelling at incision, discharge from incision, or opening of skin at incision site

## 2023-12-08 NOTE — OB RN DELIVERY SUMMARY - NS_SEPSISRSKCALC_OBGYN_ALL_OB_FT
EOS calculated successfully. EOS Risk Factor: 0.5/1000 live births ([facility] national incidence); GA=39w1d; Temp=98.24; ROM=0; GBS='Positive'; Antibiotics='No antibiotics or any antibiotics < 2 hrs prior to birth'   EOS calculated successfully. EOS Risk Factor: 0.5/1000 live births ([facility] national incidence); GA=39w1d; Temp=98.24; ROM=0; GBS='Positive'; Antibiotics='No antibiotics or any antibiotics < 2 hrs prior to birth'

## 2023-12-08 NOTE — OB RN DELIVERY SUMMARY - NSSELHIDDEN_OBGYN_ALL_OB_FT
[NS_DeliveryAttending1_OBGYN_ALL_OB_FT:[id number]==],[NS_DeliveryAssist1_OBGYN_ALL_OB_FT:[id number]] [NS_DeliveryAttending1_OBGYN_ALL_OB_FT:[id number]==],[NS_DeliveryAssist1_OBGYN_ALL_OB_FT:[id number]]

## 2023-12-08 NOTE — OB POSTPARTUM EVENT NOTE - NS_EVENTPTSUMMARY1_OBGYN_ALL_OB_FT
114/61 HR 70  RR14, 99%     Pt Fundus firm, midline, at umbilicus, bleeding light.   Pt complaining of cramping 3/10, denies requests for pain medication.    QBL at delivery 331, IVF 2000ml, total urine output for case 500cc. U/O for 1000 was 50 mL.

## 2023-12-08 NOTE — OB RN INTRAOPERATIVE NOTE - NSSELHIDDEN_OBGYN_ALL_OB_FT
[NS_DeliveryAttending1_OBGYN_ALL_OB_FT:[id number]==],[NS_DeliveryAssist1_OBGYN_ALL_OB_FT:[id number]] [NS_DeliveryAttending1_OBGYN_ALL_OB_FT:[id number]],[NS_DeliveryAssist1_OBGYN_ALL_OB_FT:[id number]=]

## 2023-12-08 NOTE — OB PROVIDER H&P - ASSESSMENT
P2 @ 39+1 presents for a scheduled repeat   admit to L&D  routine labs  IV fluids  preop meds  anesthesia consult    plan for repeat CS  john gaitan

## 2023-12-09 LAB
BASOPHILS # BLD AUTO: 0.04 K/UL — SIGNIFICANT CHANGE UP (ref 0–0.2)
BASOPHILS # BLD AUTO: 0.04 K/UL — SIGNIFICANT CHANGE UP (ref 0–0.2)
BASOPHILS NFR BLD AUTO: 0.3 % — SIGNIFICANT CHANGE UP (ref 0–2)
BASOPHILS NFR BLD AUTO: 0.3 % — SIGNIFICANT CHANGE UP (ref 0–2)
EOSINOPHIL # BLD AUTO: 0.06 K/UL — SIGNIFICANT CHANGE UP (ref 0–0.5)
EOSINOPHIL # BLD AUTO: 0.06 K/UL — SIGNIFICANT CHANGE UP (ref 0–0.5)
EOSINOPHIL NFR BLD AUTO: 0.4 % — SIGNIFICANT CHANGE UP (ref 0–6)
EOSINOPHIL NFR BLD AUTO: 0.4 % — SIGNIFICANT CHANGE UP (ref 0–6)
HCT VFR BLD CALC: 34.9 % — SIGNIFICANT CHANGE UP (ref 34.5–45)
HCT VFR BLD CALC: 34.9 % — SIGNIFICANT CHANGE UP (ref 34.5–45)
HGB BLD-MCNC: 11.8 G/DL — SIGNIFICANT CHANGE UP (ref 11.5–15.5)
HGB BLD-MCNC: 11.8 G/DL — SIGNIFICANT CHANGE UP (ref 11.5–15.5)
IANC: 9.51 K/UL — HIGH (ref 1.8–7.4)
IANC: 9.51 K/UL — HIGH (ref 1.8–7.4)
IMM GRANULOCYTES NFR BLD AUTO: 0.7 % — SIGNIFICANT CHANGE UP (ref 0–0.9)
IMM GRANULOCYTES NFR BLD AUTO: 0.7 % — SIGNIFICANT CHANGE UP (ref 0–0.9)
LYMPHOCYTES # BLD AUTO: 19.2 % — SIGNIFICANT CHANGE UP (ref 13–44)
LYMPHOCYTES # BLD AUTO: 19.2 % — SIGNIFICANT CHANGE UP (ref 13–44)
LYMPHOCYTES # BLD AUTO: 2.57 K/UL — SIGNIFICANT CHANGE UP (ref 1–3.3)
LYMPHOCYTES # BLD AUTO: 2.57 K/UL — SIGNIFICANT CHANGE UP (ref 1–3.3)
MCHC RBC-ENTMCNC: 28.5 PG — SIGNIFICANT CHANGE UP (ref 27–34)
MCHC RBC-ENTMCNC: 28.5 PG — SIGNIFICANT CHANGE UP (ref 27–34)
MCHC RBC-ENTMCNC: 33.8 GM/DL — SIGNIFICANT CHANGE UP (ref 32–36)
MCHC RBC-ENTMCNC: 33.8 GM/DL — SIGNIFICANT CHANGE UP (ref 32–36)
MCV RBC AUTO: 84.3 FL — SIGNIFICANT CHANGE UP (ref 80–100)
MCV RBC AUTO: 84.3 FL — SIGNIFICANT CHANGE UP (ref 80–100)
MONOCYTES # BLD AUTO: 1.14 K/UL — HIGH (ref 0–0.9)
MONOCYTES # BLD AUTO: 1.14 K/UL — HIGH (ref 0–0.9)
MONOCYTES NFR BLD AUTO: 8.5 % — SIGNIFICANT CHANGE UP (ref 2–14)
MONOCYTES NFR BLD AUTO: 8.5 % — SIGNIFICANT CHANGE UP (ref 2–14)
NEUTROPHILS # BLD AUTO: 9.51 K/UL — HIGH (ref 1.8–7.4)
NEUTROPHILS # BLD AUTO: 9.51 K/UL — HIGH (ref 1.8–7.4)
NEUTROPHILS NFR BLD AUTO: 70.9 % — SIGNIFICANT CHANGE UP (ref 43–77)
NEUTROPHILS NFR BLD AUTO: 70.9 % — SIGNIFICANT CHANGE UP (ref 43–77)
NRBC # BLD: 0 /100 WBCS — SIGNIFICANT CHANGE UP (ref 0–0)
NRBC # BLD: 0 /100 WBCS — SIGNIFICANT CHANGE UP (ref 0–0)
NRBC # FLD: 0 K/UL — SIGNIFICANT CHANGE UP (ref 0–0)
NRBC # FLD: 0 K/UL — SIGNIFICANT CHANGE UP (ref 0–0)
PLATELET # BLD AUTO: 273 K/UL — SIGNIFICANT CHANGE UP (ref 150–400)
PLATELET # BLD AUTO: 273 K/UL — SIGNIFICANT CHANGE UP (ref 150–400)
RBC # BLD: 4.14 M/UL — SIGNIFICANT CHANGE UP (ref 3.8–5.2)
RBC # BLD: 4.14 M/UL — SIGNIFICANT CHANGE UP (ref 3.8–5.2)
RBC # FLD: 13.2 % — SIGNIFICANT CHANGE UP (ref 10.3–14.5)
RBC # FLD: 13.2 % — SIGNIFICANT CHANGE UP (ref 10.3–14.5)
WBC # BLD: 13.41 K/UL — HIGH (ref 3.8–10.5)
WBC # BLD: 13.41 K/UL — HIGH (ref 3.8–10.5)
WBC # FLD AUTO: 13.41 K/UL — HIGH (ref 3.8–10.5)
WBC # FLD AUTO: 13.41 K/UL — HIGH (ref 3.8–10.5)

## 2023-12-09 RX ORDER — IBUPROFEN 200 MG
600 TABLET ORAL EVERY 6 HOURS
Refills: 0 | Status: DISCONTINUED | OUTPATIENT
Start: 2023-12-09 | End: 2023-12-10

## 2023-12-09 RX ADMIN — HEPARIN SODIUM 10000 UNIT(S): 5000 INJECTION INTRAVENOUS; SUBCUTANEOUS at 03:03

## 2023-12-09 RX ADMIN — SIMETHICONE 80 MILLIGRAM(S): 80 TABLET, CHEWABLE ORAL at 05:42

## 2023-12-09 RX ADMIN — Medication 30 MILLIGRAM(S): at 09:48

## 2023-12-09 RX ADMIN — Medication 600 MILLIGRAM(S): at 23:49

## 2023-12-09 RX ADMIN — Medication 975 MILLIGRAM(S): at 21:01

## 2023-12-09 RX ADMIN — Medication 30 MILLIGRAM(S): at 04:15

## 2023-12-09 RX ADMIN — SIMETHICONE 80 MILLIGRAM(S): 80 TABLET, CHEWABLE ORAL at 09:49

## 2023-12-09 RX ADMIN — Medication 200 MILLIGRAM(S): at 09:49

## 2023-12-09 RX ADMIN — Medication 975 MILLIGRAM(S): at 13:53

## 2023-12-09 RX ADMIN — Medication 975 MILLIGRAM(S): at 00:12

## 2023-12-09 RX ADMIN — Medication 30 MILLIGRAM(S): at 10:00

## 2023-12-09 RX ADMIN — HEPARIN SODIUM 10000 UNIT(S): 5000 INJECTION INTRAVENOUS; SUBCUTANEOUS at 17:02

## 2023-12-09 RX ADMIN — Medication 600 MILLIGRAM(S): at 17:48

## 2023-12-09 RX ADMIN — Medication 975 MILLIGRAM(S): at 20:31

## 2023-12-09 RX ADMIN — Medication 975 MILLIGRAM(S): at 05:42

## 2023-12-09 RX ADMIN — Medication 200 MILLIGRAM(S): at 20:31

## 2023-12-09 RX ADMIN — SIMETHICONE 80 MILLIGRAM(S): 80 TABLET, CHEWABLE ORAL at 00:12

## 2023-12-09 RX ADMIN — Medication 975 MILLIGRAM(S): at 06:52

## 2023-12-09 RX ADMIN — Medication 600 MILLIGRAM(S): at 17:03

## 2023-12-09 RX ADMIN — Medication 30 MILLIGRAM(S): at 03:03

## 2023-12-09 RX ADMIN — Medication 975 MILLIGRAM(S): at 01:05

## 2023-12-09 RX ADMIN — Medication 975 MILLIGRAM(S): at 13:15

## 2023-12-09 NOTE — PROGRESS NOTE ADULT - ASSESSMENT
Patient POD#1 from rLTCS + BS. Vital signs are stable and physical exam is unremarkable.  #Postpartum  - Increase ambulation  - Continue regular diet  - Rodriguez is removed  - Check CBC    #Rheumatoid arthritis  - continue home Adrianne Sunshine, PGY-1

## 2023-12-09 NOTE — PROGRESS NOTE ADULT - SUBJECTIVE AND OBJECTIVE BOX
Patient POD#1 from rLTCS + BS. Patient seen and examined at bedside, no acute overnight events. No acute complaints, pain well controlled. Patient is ambulating, voiding spontaneously, passing flatus, and tolerating regular diet. Denies CP, SOB, N/V, HA, blurred vision, epigastric pain.    Vital Signs Last 24 Hours  T(C): 36.4 (12-09-23 @ 01:22), Max: 37.1 (12-08-23 @ 18:05)  HR: 80 (12-09-23 @ 01:22) (67 - 100)  BP: 108/54 (12-09-23 @ 01:22) (102/55 - 121/67)  RR: 18 (12-09-23 @ 01:22) (14 - 18)  SpO2: 99% (12-09-23 @ 01:22) (97% - 100%)    Physical Exam:  General: NAD  Abdomen: Soft, non-tender, non-distended, fundus firm  Incision: Pfannenstiel incision CDI, subcuticular suture closure  Pelvic: Lochia wnl    Labs:    Blood Type: O Positive  Antibody Screen: Negative  RPR: Negative               13.1   10.72 )-----------( 296      ( 12-08 @ 06:15 )             38.5                12.3   8.74  )-----------( 301      ( 12-01 @ 15:30 )             35.9                13.1   11.70 )-----------( 265      ( 11-11 @ 02:38 )             37.8         MEDICATIONS  (STANDING):  acetaminophen     Tablet .. 975 milliGRAM(s) Oral every 6 hours  diphtheria/tetanus/pertussis (acellular) Vaccine (Adacel) 0.5 milliLiter(s) IntraMuscular once  heparin   Injectable 10010 Unit(s) SubCutaneous every 12 hours  hydroxychloroquine 200 milliGRAM(s) Oral two times a day  ibuprofen  Tablet. 600 milliGRAM(s) Oral every 6 hours  influenza   Vaccine 0.5 milliLiter(s) IntraMuscular once  ketorolac   Injectable 30 milliGRAM(s) IV Push every 6 hours  lactated ringers Bolus 1000 milliLiter(s) IV Bolus once  lactated ringers. 1000 milliLiter(s) (75 mL/Hr) IV Continuous <Continuous>  morphine PF Spinal 0.1 milliGRAM(s) IntraThecal. once    MEDICATIONS  (PRN):  dexAMETHasone  Injectable 4 milliGRAM(s) IV Push every 6 hours PRN Nausea  diphenhydrAMINE 25 milliGRAM(s) Oral every 6 hours PRN Pruritus  lanolin Ointment 1 Application(s) Topical every 6 hours PRN Sore Nipples  magnesium hydroxide Suspension 30 milliLiter(s) Oral two times a day PRN Constipation  naloxone Injectable 0.1 milliGRAM(s) IV Push every 3 minutes PRN For ANY of the following changes in patient status:  A. Breaths Per Minute LESS THAN 10, B. Oxygen saturation LESS THAN 90%, C. Sedation score of 6 for Stop After: 4 Times  ondansetron Injectable 4 milliGRAM(s) IV Push every 6 hours PRN Nausea  oxyCODONE    IR 5 milliGRAM(s) Oral once PRN Moderate to Severe Pain (4-10)  oxyCODONE    IR 5 milliGRAM(s) Oral every 3 hours PRN Moderate to Severe Pain (4-10)  simethicone 80 milliGRAM(s) Chew every 4 hours PRN Gas   Patient POD#1 from rLTCS + BS. Patient seen and examined at bedside, no acute overnight events. No acute complaints, pain well controlled. Patient is ambulating, voiding spontaneously, passing flatus, and tolerating regular diet. Denies CP, SOB, N/V, HA, blurred vision, epigastric pain.    Vital Signs Last 24 Hours  T(C): 36.4 (12-09-23 @ 01:22), Max: 37.1 (12-08-23 @ 18:05)  HR: 80 (12-09-23 @ 01:22) (67 - 100)  BP: 108/54 (12-09-23 @ 01:22) (102/55 - 121/67)  RR: 18 (12-09-23 @ 01:22) (14 - 18)  SpO2: 99% (12-09-23 @ 01:22) (97% - 100%)    Physical Exam:  General: NAD  Abdomen: Soft, non-tender, non-distended, fundus firm  Incision: Pfannenstiel incision CDI, subcuticular suture closure  Pelvic: Lochia wnl    Labs:    Blood Type: O Positive  Antibody Screen: Negative  RPR: Negative               13.1   10.72 )-----------( 296      ( 12-08 @ 06:15 )             38.5                12.3   8.74  )-----------( 301      ( 12-01 @ 15:30 )             35.9                13.1   11.70 )-----------( 265      ( 11-11 @ 02:38 )             37.8         MEDICATIONS  (STANDING):  acetaminophen     Tablet .. 975 milliGRAM(s) Oral every 6 hours  diphtheria/tetanus/pertussis (acellular) Vaccine (Adacel) 0.5 milliLiter(s) IntraMuscular once  heparin   Injectable 58879 Unit(s) SubCutaneous every 12 hours  hydroxychloroquine 200 milliGRAM(s) Oral two times a day  ibuprofen  Tablet. 600 milliGRAM(s) Oral every 6 hours  influenza   Vaccine 0.5 milliLiter(s) IntraMuscular once  ketorolac   Injectable 30 milliGRAM(s) IV Push every 6 hours  lactated ringers Bolus 1000 milliLiter(s) IV Bolus once  lactated ringers. 1000 milliLiter(s) (75 mL/Hr) IV Continuous <Continuous>  morphine PF Spinal 0.1 milliGRAM(s) IntraThecal. once    MEDICATIONS  (PRN):  dexAMETHasone  Injectable 4 milliGRAM(s) IV Push every 6 hours PRN Nausea  diphenhydrAMINE 25 milliGRAM(s) Oral every 6 hours PRN Pruritus  lanolin Ointment 1 Application(s) Topical every 6 hours PRN Sore Nipples  magnesium hydroxide Suspension 30 milliLiter(s) Oral two times a day PRN Constipation  naloxone Injectable 0.1 milliGRAM(s) IV Push every 3 minutes PRN For ANY of the following changes in patient status:  A. Breaths Per Minute LESS THAN 10, B. Oxygen saturation LESS THAN 90%, C. Sedation score of 6 for Stop After: 4 Times  ondansetron Injectable 4 milliGRAM(s) IV Push every 6 hours PRN Nausea  oxyCODONE    IR 5 milliGRAM(s) Oral once PRN Moderate to Severe Pain (4-10)  oxyCODONE    IR 5 milliGRAM(s) Oral every 3 hours PRN Moderate to Severe Pain (4-10)  simethicone 80 milliGRAM(s) Chew every 4 hours PRN Gas

## 2023-12-10 VITALS
HEART RATE: 86 BPM | SYSTOLIC BLOOD PRESSURE: 109 MMHG | OXYGEN SATURATION: 99 % | DIASTOLIC BLOOD PRESSURE: 59 MMHG | RESPIRATION RATE: 19 BRPM | TEMPERATURE: 98 F

## 2023-12-10 RX ORDER — HYDROXYCHLOROQUINE SULFATE 200 MG
1 TABLET ORAL
Qty: 0 | Refills: 0 | DISCHARGE
Start: 2023-12-10

## 2023-12-10 RX ORDER — ACETAMINOPHEN 500 MG
2 TABLET ORAL
Qty: 0 | Refills: 0 | DISCHARGE
Start: 2023-12-10

## 2023-12-10 RX ORDER — HYDROXYCHLOROQUINE SULFATE 200 MG
0 TABLET ORAL
Qty: 0 | Refills: 0 | DISCHARGE

## 2023-12-10 RX ORDER — OXYCODONE HYDROCHLORIDE 5 MG/1
5 TABLET ORAL
Refills: 0 | Status: DISCONTINUED | OUTPATIENT
Start: 2023-12-10 | End: 2023-12-10

## 2023-12-10 RX ORDER — IBUPROFEN 200 MG
1 TABLET ORAL
Qty: 0 | Refills: 0 | DISCHARGE
Start: 2023-12-10

## 2023-12-10 RX ADMIN — OXYCODONE HYDROCHLORIDE 5 MILLIGRAM(S): 5 TABLET ORAL at 05:24

## 2023-12-10 RX ADMIN — Medication 600 MILLIGRAM(S): at 00:19

## 2023-12-10 RX ADMIN — Medication 600 MILLIGRAM(S): at 11:12

## 2023-12-10 RX ADMIN — HEPARIN SODIUM 10000 UNIT(S): 5000 INJECTION INTRAVENOUS; SUBCUTANEOUS at 05:59

## 2023-12-10 RX ADMIN — Medication 600 MILLIGRAM(S): at 12:00

## 2023-12-10 RX ADMIN — Medication 975 MILLIGRAM(S): at 09:15

## 2023-12-10 RX ADMIN — Medication 975 MILLIGRAM(S): at 08:40

## 2023-12-10 RX ADMIN — OXYCODONE HYDROCHLORIDE 5 MILLIGRAM(S): 5 TABLET ORAL at 04:54

## 2023-12-10 RX ADMIN — Medication 200 MILLIGRAM(S): at 08:41

## 2023-12-10 NOTE — PROGRESS NOTE ADULT - SUBJECTIVE AND OBJECTIVE BOX
S: 34yo POD#2 s/p rLTCS. PMH significant for rheumatoid arthritis on Plaquenil. , H/H 13.1/38.5->11.8/34.9. Pain is well controlled. She is tolerating a regular diet and passing flatus. She is voiding spontaneously, and ambulating without difficulty. Denies CP/SOB. Denies lightheadedness/dizziness. Denies N/V.    O:  Vitals:  Vital Signs Last 24 Hrs  T(C): 36.6 (10 Dec 2023 05:49), Max: 36.8 (09 Dec 2023 14:30)  T(F): 97.9 (10 Dec 2023 05:49), Max: 98.3 (09 Dec 2023 14:30)  HR: 79 (10 Dec 2023 05:49) (79 - 93)  BP: 108/54 (10 Dec 2023 05:49) (108/54 - 117/52)  BP(mean): --  RR: 18 (10 Dec 2023 05:49) (18 - 18)  SpO2: 99% (10 Dec 2023 05:49) (99% - 100%)    Parameters above as of 10 Dec 2023 05:49  Patient On (Oxygen Delivery Method): room air        MEDICATIONS  (STANDING):  acetaminophen     Tablet .. 975 milliGRAM(s) Oral every 6 hours  diphtheria/tetanus/pertussis (acellular) Vaccine (Adacel) 0.5 milliLiter(s) IntraMuscular once  heparin   Injectable 27433 Unit(s) SubCutaneous every 12 hours  hydroxychloroquine 200 milliGRAM(s) Oral two times a day  ibuprofen  Tablet. 600 milliGRAM(s) Oral every 6 hours  influenza   Vaccine 0.5 milliLiter(s) IntraMuscular once      MEDICATIONS  (PRN):  diphenhydrAMINE 25 milliGRAM(s) Oral every 6 hours PRN Pruritus  lanolin Ointment 1 Application(s) Topical every 6 hours PRN Sore Nipples  magnesium hydroxide Suspension 30 milliLiter(s) Oral two times a day PRN Constipation  oxyCODONE    IR 5 milliGRAM(s) Oral once PRN Moderate to Severe Pain (4-10)  oxyCODONE    IR 5 milliGRAM(s) Oral every 3 hours PRN Moderate to Severe Pain (4-10)  simethicone 80 milliGRAM(s) Chew every 4 hours PRN Gas      Labs:  Blood type: O Positive  Rubella IgG: RPR: Negative                          11.8   13.41<H> >-----------< 273    ( 12-09 @ 05:20 )             34.9                        13.1   10.72<H> >-----------< 296    ( 12-08 @ 06:15 )             38.5        PE:  General: NAD  Abdomen: Soft, appropriately tender, incision c/d/i.  Lochia: WNL  Extremities: No calf tenderness/mild pedal edema    A/P: 34yo  POD#2 s/p rLTCS c/b RA, Patient is stable and doing well post-operatively.      #Rheumatoid arthritis  -Continue Plaquenil  -O/P f/u with rheumatology    #Post-Partum  - Continue regular diet.  - Encouraged use of abdominal binder.  - Increase ambulation.  - Continue Motrin, Tylenol, Oxycodone PRN for pain control.    -D/C plan-Patient is stable and desires discharge today    ROSSI Stout S: 34yo POD#2 s/p rLTCS. PMH significant for rheumatoid arthritis on Plaquenil. , H/H 13.1/38.5->11.8/34.9. Pain is well controlled. She is tolerating a regular diet and passing flatus. She is voiding spontaneously, and ambulating without difficulty. Denies CP/SOB. Denies lightheadedness/dizziness. Denies N/V.    O:  Vitals:  Vital Signs Last 24 Hrs  T(C): 36.6 (10 Dec 2023 05:49), Max: 36.8 (09 Dec 2023 14:30)  T(F): 97.9 (10 Dec 2023 05:49), Max: 98.3 (09 Dec 2023 14:30)  HR: 79 (10 Dec 2023 05:49) (79 - 93)  BP: 108/54 (10 Dec 2023 05:49) (108/54 - 117/52)  BP(mean): --  RR: 18 (10 Dec 2023 05:49) (18 - 18)  SpO2: 99% (10 Dec 2023 05:49) (99% - 100%)    Parameters above as of 10 Dec 2023 05:49  Patient On (Oxygen Delivery Method): room air        MEDICATIONS  (STANDING):  acetaminophen     Tablet .. 975 milliGRAM(s) Oral every 6 hours  diphtheria/tetanus/pertussis (acellular) Vaccine (Adacel) 0.5 milliLiter(s) IntraMuscular once  heparin   Injectable 97043 Unit(s) SubCutaneous every 12 hours  hydroxychloroquine 200 milliGRAM(s) Oral two times a day  ibuprofen  Tablet. 600 milliGRAM(s) Oral every 6 hours  influenza   Vaccine 0.5 milliLiter(s) IntraMuscular once      MEDICATIONS  (PRN):  diphenhydrAMINE 25 milliGRAM(s) Oral every 6 hours PRN Pruritus  lanolin Ointment 1 Application(s) Topical every 6 hours PRN Sore Nipples  magnesium hydroxide Suspension 30 milliLiter(s) Oral two times a day PRN Constipation  oxyCODONE    IR 5 milliGRAM(s) Oral once PRN Moderate to Severe Pain (4-10)  oxyCODONE    IR 5 milliGRAM(s) Oral every 3 hours PRN Moderate to Severe Pain (4-10)  simethicone 80 milliGRAM(s) Chew every 4 hours PRN Gas      Labs:  Blood type: O Positive  Rubella IgG: RPR: Negative                          11.8   13.41<H> >-----------< 273    ( 12-09 @ 05:20 )             34.9                        13.1   10.72<H> >-----------< 296    ( 12-08 @ 06:15 )             38.5        PE:  General: NAD  Abdomen: Soft, appropriately tender, incision c/d/i.  Lochia: WNL  Extremities: No calf tenderness/mild pedal edema    A/P: 34yo  POD#2 s/p rLTCS c/b RA, Patient is stable and doing well post-operatively.      #Rheumatoid arthritis  -Continue Plaquenil  -O/P f/u with rheumatology    #Post-Partum  - Continue regular diet.  - Encouraged use of abdominal binder.  - Increase ambulation.  - Continue Motrin, Tylenol, Oxycodone PRN for pain control.    -D/C plan-Patient is stable and desires discharge today    ROSSI Stout

## 2023-12-11 NOTE — OB POSTPARTUM EVENT NOTE - NS_EVENTSUMMARY1_OBGYN_ALL_OB_FT
[facility]    Headache Neurology Progress Note  December 11, 2023    Subjective:    Camila Juarez returns for follow up of chronic migraine.  She continues on Emgality subcutaneous injection every 28 days.    Today, she reports that her headaches remain improved.  She has 2-3 migraine attacks per month.  This is a significant reduction from previous.  Previously, she had more than 15 headache days a month.    She continues on Emgality. She denies side effects.    She reports that she is no longer thought to have myasthenia gravis.  This was a previous concern after she had ptosis.  Left eyelid ptosis is associated with headache.    Otherwise, she takes Ozempic. She started about a month ago. She is vomiting more now. She is making an effort to eat consistently to avoid headaches.     Objective:    Vitals: There were no vitals taken for this visit.  General: Cooperative, NAD  Neurologic:  Mental Status: Fully alert, attentive and oriented. Speech clear and fluent.   Cranial Nerves: Facial movements symmetric, with the exception of left eye ptosis.   Motor: No abnormal movements.      Assessment/Plan:   Camila Juarez is a 52 year old woman returns for follow-up of chronic migraine.  She continues to have good benefit with Emgality subcutaneous injection every 28 days.  Previously, she had trialed botulinum toxin injections, although this worsened baseline ptosis and was stopped.     I recommend she continue Emgality subcutaneous injection every 28 days.  We will plan to continue this for the next 1 year.     For acute treatment, she will continue metoclopramide 10 mg as needed for headache or nausea.  I have provided 1 year of refills for her.    I will plan to see her back in 1 year, or sooner if needed.    Brooke Morales MD  Neurology      Urine output for 1100 is 25mL. Adequate urine output is 49 mL

## 2023-12-12 PROBLEM — Z34.90 ENCOUNTER FOR SUPERVISION OF NORMAL PREGNANCY, UNSPECIFIED, UNSPECIFIED TRIMESTER: Chronic | Status: ACTIVE | Noted: 2023-12-01

## 2023-12-15 ENCOUNTER — OUTPATIENT (OUTPATIENT)
Dept: INPATIENT UNIT | Facility: HOSPITAL | Age: 33
LOS: 1 days | Discharge: HOME | End: 2023-12-15
Payer: MEDICAID

## 2023-12-15 VITALS — HEART RATE: 67 BPM | SYSTOLIC BLOOD PRESSURE: 140 MMHG | DIASTOLIC BLOOD PRESSURE: 64 MMHG

## 2023-12-15 DIAGNOSIS — Z98.891 HISTORY OF UTERINE SCAR FROM PREVIOUS SURGERY: Chronic | ICD-10-CM

## 2023-12-15 DIAGNOSIS — Z98.890 OTHER SPECIFIED POSTPROCEDURAL STATES: Chronic | ICD-10-CM

## 2023-12-15 DIAGNOSIS — O26.899 OTHER SPECIFIED PREGNANCY RELATED CONDITIONS, UNSPECIFIED TRIMESTER: ICD-10-CM

## 2023-12-15 LAB
ALBUMIN SERPL ELPH-MCNC: 3.6 G/DL — SIGNIFICANT CHANGE UP (ref 3.3–5)
ALBUMIN SERPL ELPH-MCNC: 3.6 G/DL — SIGNIFICANT CHANGE UP (ref 3.3–5)
ALP SERPL-CCNC: 125 U/L — HIGH (ref 40–120)
ALP SERPL-CCNC: 125 U/L — HIGH (ref 40–120)
ALT FLD-CCNC: 32 U/L — SIGNIFICANT CHANGE UP (ref 4–33)
ALT FLD-CCNC: 32 U/L — SIGNIFICANT CHANGE UP (ref 4–33)
ANION GAP SERPL CALC-SCNC: 12 MMOL/L — SIGNIFICANT CHANGE UP (ref 7–14)
ANION GAP SERPL CALC-SCNC: 12 MMOL/L — SIGNIFICANT CHANGE UP (ref 7–14)
APTT BLD: 31.6 SEC — SIGNIFICANT CHANGE UP (ref 24.5–35.6)
APTT BLD: 31.6 SEC — SIGNIFICANT CHANGE UP (ref 24.5–35.6)
AST SERPL-CCNC: 16 U/L — SIGNIFICANT CHANGE UP (ref 4–32)
AST SERPL-CCNC: 16 U/L — SIGNIFICANT CHANGE UP (ref 4–32)
BASOPHILS # BLD AUTO: 0.04 K/UL — SIGNIFICANT CHANGE UP (ref 0–0.2)
BASOPHILS # BLD AUTO: 0.04 K/UL — SIGNIFICANT CHANGE UP (ref 0–0.2)
BASOPHILS NFR BLD AUTO: 0.5 % — SIGNIFICANT CHANGE UP (ref 0–2)
BASOPHILS NFR BLD AUTO: 0.5 % — SIGNIFICANT CHANGE UP (ref 0–2)
BILIRUB SERPL-MCNC: <0.2 MG/DL — SIGNIFICANT CHANGE UP (ref 0.2–1.2)
BILIRUB SERPL-MCNC: <0.2 MG/DL — SIGNIFICANT CHANGE UP (ref 0.2–1.2)
BUN SERPL-MCNC: 22 MG/DL — SIGNIFICANT CHANGE UP (ref 7–23)
BUN SERPL-MCNC: 22 MG/DL — SIGNIFICANT CHANGE UP (ref 7–23)
CALCIUM SERPL-MCNC: 10.1 MG/DL — SIGNIFICANT CHANGE UP (ref 8.4–10.5)
CALCIUM SERPL-MCNC: 10.1 MG/DL — SIGNIFICANT CHANGE UP (ref 8.4–10.5)
CHLORIDE SERPL-SCNC: 104 MMOL/L — SIGNIFICANT CHANGE UP (ref 98–107)
CHLORIDE SERPL-SCNC: 104 MMOL/L — SIGNIFICANT CHANGE UP (ref 98–107)
CO2 SERPL-SCNC: 24 MMOL/L — SIGNIFICANT CHANGE UP (ref 22–31)
CO2 SERPL-SCNC: 24 MMOL/L — SIGNIFICANT CHANGE UP (ref 22–31)
CREAT SERPL-MCNC: 0.75 MG/DL — SIGNIFICANT CHANGE UP (ref 0.5–1.3)
CREAT SERPL-MCNC: 0.75 MG/DL — SIGNIFICANT CHANGE UP (ref 0.5–1.3)
EGFR: 106 ML/MIN/1.73M2 — SIGNIFICANT CHANGE UP
EGFR: 106 ML/MIN/1.73M2 — SIGNIFICANT CHANGE UP
EOSINOPHIL # BLD AUTO: 0.1 K/UL — SIGNIFICANT CHANGE UP (ref 0–0.5)
EOSINOPHIL # BLD AUTO: 0.1 K/UL — SIGNIFICANT CHANGE UP (ref 0–0.5)
EOSINOPHIL NFR BLD AUTO: 1.3 % — SIGNIFICANT CHANGE UP (ref 0–6)
EOSINOPHIL NFR BLD AUTO: 1.3 % — SIGNIFICANT CHANGE UP (ref 0–6)
FIBRINOGEN PPP-MCNC: 411 MG/DL — SIGNIFICANT CHANGE UP (ref 200–465)
FIBRINOGEN PPP-MCNC: 411 MG/DL — SIGNIFICANT CHANGE UP (ref 200–465)
GLUCOSE SERPL-MCNC: 103 MG/DL — HIGH (ref 70–99)
GLUCOSE SERPL-MCNC: 103 MG/DL — HIGH (ref 70–99)
HCT VFR BLD CALC: 34.3 % — LOW (ref 34.5–45)
HCT VFR BLD CALC: 34.3 % — LOW (ref 34.5–45)
HGB BLD-MCNC: 11.1 G/DL — LOW (ref 11.5–15.5)
HGB BLD-MCNC: 11.1 G/DL — LOW (ref 11.5–15.5)
IANC: 4.68 K/UL — SIGNIFICANT CHANGE UP (ref 1.8–7.4)
IANC: 4.68 K/UL — SIGNIFICANT CHANGE UP (ref 1.8–7.4)
IMM GRANULOCYTES NFR BLD AUTO: 0.5 % — SIGNIFICANT CHANGE UP (ref 0–0.9)
IMM GRANULOCYTES NFR BLD AUTO: 0.5 % — SIGNIFICANT CHANGE UP (ref 0–0.9)
INR BLD: 0.93 RATIO — SIGNIFICANT CHANGE UP (ref 0.85–1.18)
INR BLD: 0.93 RATIO — SIGNIFICANT CHANGE UP (ref 0.85–1.18)
LDH SERPL L TO P-CCNC: 221 U/L — SIGNIFICANT CHANGE UP (ref 135–225)
LDH SERPL L TO P-CCNC: 221 U/L — SIGNIFICANT CHANGE UP (ref 135–225)
LYMPHOCYTES # BLD AUTO: 2.06 K/UL — SIGNIFICANT CHANGE UP (ref 1–3.3)
LYMPHOCYTES # BLD AUTO: 2.06 K/UL — SIGNIFICANT CHANGE UP (ref 1–3.3)
LYMPHOCYTES # BLD AUTO: 27.5 % — SIGNIFICANT CHANGE UP (ref 13–44)
LYMPHOCYTES # BLD AUTO: 27.5 % — SIGNIFICANT CHANGE UP (ref 13–44)
MCHC RBC-ENTMCNC: 27.7 PG — SIGNIFICANT CHANGE UP (ref 27–34)
MCHC RBC-ENTMCNC: 27.7 PG — SIGNIFICANT CHANGE UP (ref 27–34)
MCHC RBC-ENTMCNC: 32.4 GM/DL — SIGNIFICANT CHANGE UP (ref 32–36)
MCHC RBC-ENTMCNC: 32.4 GM/DL — SIGNIFICANT CHANGE UP (ref 32–36)
MCV RBC AUTO: 85.5 FL — SIGNIFICANT CHANGE UP (ref 80–100)
MCV RBC AUTO: 85.5 FL — SIGNIFICANT CHANGE UP (ref 80–100)
MONOCYTES # BLD AUTO: 0.57 K/UL — SIGNIFICANT CHANGE UP (ref 0–0.9)
MONOCYTES # BLD AUTO: 0.57 K/UL — SIGNIFICANT CHANGE UP (ref 0–0.9)
MONOCYTES NFR BLD AUTO: 7.6 % — SIGNIFICANT CHANGE UP (ref 2–14)
MONOCYTES NFR BLD AUTO: 7.6 % — SIGNIFICANT CHANGE UP (ref 2–14)
NEUTROPHILS # BLD AUTO: 4.68 K/UL — SIGNIFICANT CHANGE UP (ref 1.8–7.4)
NEUTROPHILS # BLD AUTO: 4.68 K/UL — SIGNIFICANT CHANGE UP (ref 1.8–7.4)
NEUTROPHILS NFR BLD AUTO: 62.6 % — SIGNIFICANT CHANGE UP (ref 43–77)
NEUTROPHILS NFR BLD AUTO: 62.6 % — SIGNIFICANT CHANGE UP (ref 43–77)
NRBC # BLD: 0 /100 WBCS — SIGNIFICANT CHANGE UP (ref 0–0)
NRBC # BLD: 0 /100 WBCS — SIGNIFICANT CHANGE UP (ref 0–0)
NRBC # FLD: 0 K/UL — SIGNIFICANT CHANGE UP (ref 0–0)
NRBC # FLD: 0 K/UL — SIGNIFICANT CHANGE UP (ref 0–0)
PLATELET # BLD AUTO: 355 K/UL — SIGNIFICANT CHANGE UP (ref 150–400)
PLATELET # BLD AUTO: 355 K/UL — SIGNIFICANT CHANGE UP (ref 150–400)
POTASSIUM SERPL-MCNC: 4.2 MMOL/L — SIGNIFICANT CHANGE UP (ref 3.5–5.3)
POTASSIUM SERPL-MCNC: 4.2 MMOL/L — SIGNIFICANT CHANGE UP (ref 3.5–5.3)
POTASSIUM SERPL-SCNC: 4.2 MMOL/L — SIGNIFICANT CHANGE UP (ref 3.5–5.3)
POTASSIUM SERPL-SCNC: 4.2 MMOL/L — SIGNIFICANT CHANGE UP (ref 3.5–5.3)
PROT SERPL-MCNC: 6.6 G/DL — SIGNIFICANT CHANGE UP (ref 6–8.3)
PROT SERPL-MCNC: 6.6 G/DL — SIGNIFICANT CHANGE UP (ref 6–8.3)
PROTHROM AB SERPL-ACNC: 10.5 SEC — SIGNIFICANT CHANGE UP (ref 9.5–13)
PROTHROM AB SERPL-ACNC: 10.5 SEC — SIGNIFICANT CHANGE UP (ref 9.5–13)
RBC # BLD: 4.01 M/UL — SIGNIFICANT CHANGE UP (ref 3.8–5.2)
RBC # BLD: 4.01 M/UL — SIGNIFICANT CHANGE UP (ref 3.8–5.2)
RBC # FLD: 12.9 % — SIGNIFICANT CHANGE UP (ref 10.3–14.5)
RBC # FLD: 12.9 % — SIGNIFICANT CHANGE UP (ref 10.3–14.5)
SODIUM SERPL-SCNC: 140 MMOL/L — SIGNIFICANT CHANGE UP (ref 135–145)
SODIUM SERPL-SCNC: 140 MMOL/L — SIGNIFICANT CHANGE UP (ref 135–145)
URATE SERPL-MCNC: 9.2 MG/DL — HIGH (ref 2.5–7)
URATE SERPL-MCNC: 9.2 MG/DL — HIGH (ref 2.5–7)
WBC # BLD: 7.49 K/UL — SIGNIFICANT CHANGE UP (ref 3.8–10.5)
WBC # BLD: 7.49 K/UL — SIGNIFICANT CHANGE UP (ref 3.8–10.5)
WBC # FLD AUTO: 7.49 K/UL — SIGNIFICANT CHANGE UP (ref 3.8–10.5)
WBC # FLD AUTO: 7.49 K/UL — SIGNIFICANT CHANGE UP (ref 3.8–10.5)

## 2023-12-15 PROCEDURE — 99212 OFFICE O/P EST SF 10 MIN: CPT

## 2023-12-15 RX ORDER — NIFEDIPINE 30 MG
1 TABLET, EXTENDED RELEASE 24 HR ORAL
Qty: 30 | Refills: 0
Start: 2023-12-15 | End: 2024-01-13

## 2023-12-15 RX ORDER — NIFEDIPINE 30 MG
30 TABLET, EXTENDED RELEASE 24 HR ORAL ONCE
Refills: 0 | Status: COMPLETED | OUTPATIENT
Start: 2023-12-15 | End: 2023-12-15

## 2023-12-15 RX ADMIN — Medication 30 MILLIGRAM(S): at 04:36

## 2023-12-15 NOTE — OB POSTPARTUM TRIAGE NOTE - NSOBPROVIDERNOTE_OBGYN_ALL_OB_FT
4:30  Patient presented to Dr. Loyola  [facility] labs WNL  Asymptomatic  Labile BPs 140s/90s  Procardia XL 30mg x1  Monitor blood pressures and response to medications till 6am, re evaluate 4:30  Patient presented to Dr. Loyola  [facility] labs WNL  Asymptomatic  Labile BPs 140s/90s  Procardia XL 30mg x1  Monitor blood pressures and response to medications till 6am, re evaluate 4:30  Patient presented to Dr. Loyola  [facility] labs WNL  Asymptomatic  Labile BPs 140s/90s  Procardia XL 30mg x1  Monitor blood pressures and response to medications till 6am, re evaluate    @ 0550  - Discussed with Dr. Loyola  - Bps 130s/60s and pt denies any ssx currently  - Start Procardia 30 mg XL  - If blood pressures > 140s/90s call office, if 150s/100s or headache not relieved with tylenol, RUQ pain, epigastric pain, or blurry vision, come to hospital  - Follow up with Dr. Hillman next week 4:30  Patient presented to Dr. Loyola  [facility] labs WNL  Asymptomatic  Labile BPs 140s/90s  Procardia XL 30mg x1  Monitor blood pressures and response to medications till 6am, re evaluate    @ 0550  - Discussed with Dr. Loyola  - Bps 130s/60s and pt denies any ssx currently  - Start Procardia 30 mg XL  - If blood pressures > 140s/90s call office, if 150s/100s or headache not relieved with tylenol, RUQ pain, epigastric pain, or blurry vision, come to hospital  - Follow up with Dr. Hillman next week

## 2023-12-15 NOTE — OB POSTPARTUM TRIAGE NOTE - FALL HARM RISK - UNIVERSAL INTERVENTIONS
Bed in lowest position, wheels locked, appropriate side rails in place/Call bell, personal items and telephone in reach/Instruct patient to call for assistance before getting out of bed or chair/Non-slip footwear when patient is out of bed/[place] to call system/Physically safe environment - no spills, clutter or unnecessary equipment/Purposeful Proactive Rounding/Room/bathroom lighting operational, light cord in reach Bed in lowest position, wheels locked, appropriate side rails in place/Call bell, personal items and telephone in reach/Instruct patient to call for assistance before getting out of bed or chair/Non-slip footwear when patient is out of bed/[place] to call system/Physically safe environment - no spills, clutter or unnecessary equipment/Purposeful Proactive Rounding/Room/bathroom lighting operational, light cord in reach

## 2023-12-15 NOTE — OB POSTPARTUM TRIAGE NOTE - ADDITIONAL INSTRUCTIONS
- Start Procardia 30 mg XL  - If blood pressures > 140s/90s call office, if 150s/100s or headache not relieved with tylenol, RUQ pain, epigastric pain, or blurry vision, come to hospital  - Follow up with Dr. Hillman next week

## 2023-12-15 NOTE — OB POSTPARTUM TRIAGE NOTE - NS_OBGYNHISTORY_OBGYN_ALL_OB_FT
GYN: HSV-1, Ovarian Cyst   OB: C/S x3  2009/2021/2023    C/S 12/8/2023, repeat GYN: HSV-1, Ovarian Cyst   OB: C/S x3  2009/2021/2023    C/S 12/8/2023, repeat with tubal

## 2023-12-15 NOTE — OB POSTPARTUM TRIAGE NOTE - HISTORY OF PRESENT ILLNESS
32y/o  PP from 2023, rpt C/S, presents with right sided pain 7/10, HA that was a 6/10 but not on its own is a 2/10 with no medication. Patient states when she had the headache and side pain at home she took her blood pressure and it was 149/88 & 141/86.  Deneis N/V/D/Fever/Aches/Chills/Sick Contacts     Allergies: Denies  Medications: PNV, Plaquenil 200 BID, Vit D    Medical HX: Rheumatoid Arthritis  Surgical HX: Right Knee Surgery    Psy HX: Anxiety   Denies etoh/smoke/drugs 34y/o  PP from 2023, rpt C/S, presents with right sided pain 7/10, HA that was a 6/10 but not on its own is a 2/10 with no medication. Patient states when she had the headache and side pain at home she took her blood pressure and it was 149/88 & 141/86.  Deneis N/V/D/Fever/Aches/Chills/Sick Contacts     Allergies: Denies  Medications: PNV, Plaquenil 200 BID, Vit D    Medical HX: Rheumatoid Arthritis  Surgical HX: Right Knee Surgery    Psy HX: Anxiety   Denies etoh/smoke/drugs 32y/o  PP from 2023, rpt C/S, presents with right sided pain 7/10, HA that was a 6/10 but not on its own is a 2/10 with no medication. Patient states when she had the headache and side pain at home she took her blood pressure and it was 149/88 & 141/86. Denies bp concerns during her pregnancy or post partum, she used her mothers bp cuff because she felt off.   Deneis N/V/D/Fever/Aches/Chills/Sick Contacts     Allergies: Denies  Medications: PNV, Plaquenil 200 BID, Vit D    Medical HX: Rheumatoid Arthritis  Surgical HX: Right Knee Surgery    Psy HX: Anxiety   Denies etoh/smoke/drugs 32y/o  PP from 2023, rpt C/S, presents with right sided pain /10, that is resolved, HA that was a 6/10 but not on its own is a 2/10 with no medication. Patient states when she had the headache and side pain at home she took her blood pressure and it was 149/88 & 141/86. Denies bp concerns during her pregnancy or post partum, she used her mothers bp cuff because she felt off.   Deneis N/V/D/Fever/Aches/Chills/Sick Contacts     Allergies: Denies  Medications: PNV, Plaquenil 200 BID, Vit D    Medical HX: Rheumatoid Arthritis  Surgical HX: Right Knee Surgery    Psy HX: Anxiety   Denies etoh/smoke/drugs 34y/o  PP from 2023, rpt C/S, presents with right sided pain /10, that is resolved, HA that was a 6/10 but not on its own is a 2/10 with no medication. Patient states when she had the headache and side pain at home she took her blood pressure and it was 149/88 & 141/86. Denies bp concerns during her pregnancy or post partum, she used her mothers bp cuff because she felt off.   Deneis N/V/D/Fever/Aches/Chills/Sick Contacts     Allergies: Denies  Medications: PNV, Plaquenil 200 BID, Vit D    Medical HX: Rheumatoid Arthritis  Surgical HX: Right Knee Surgery    Psy HX: Anxiety   Denies etoh/smoke/drugs

## 2023-12-15 NOTE — OB POSTPARTUM TRIAGE NOTE - NSHPPHYSICALEXAM_GEN_ALL_CORE
Assessment reveals VSS  140/64, 121/66  General: Female sitting comfortably in no apparent distress  Neuro: No facial asymmetry, no slurred speech, moves all 4 extremities  Mood: Alert and lucid, appropriate mood and affect  A&Ox3  Lungs- clear bilateral  Heart- normal rate and regular rhythm  Extremities- Warm, Dry, no edema present, good pulses   Abdomen soft, NT        PLAN:  HELLP labs  Serial BPs Assessment reveals VSS  140/64, 121/66, 147/84  General: Female sitting comfortably in no apparent distress  Neuro: No facial asymmetry, no slurred speech, moves all 4 extremities  Mood: Alert and lucid, appropriate mood and affect  A&Ox3  Lungs- clear bilateral  Heart- normal rate and regular rhythm  Extremities- Warm, Dry, ,mild bilateral ankle/foot edema present, good pulses, denies pain   Abdomen soft, NT        PLAN:  HELLP labs  Serial BPs

## 2023-12-22 ENCOUNTER — APPOINTMENT (OUTPATIENT)
Dept: OBGYN | Facility: CLINIC | Age: 33
End: 2023-12-22
Payer: MEDICAID

## 2023-12-22 VITALS
HEIGHT: 62 IN | SYSTOLIC BLOOD PRESSURE: 128 MMHG | WEIGHT: 198 LBS | DIASTOLIC BLOOD PRESSURE: 78 MMHG | BODY MASS INDEX: 36.44 KG/M2

## 2023-12-22 PROCEDURE — 99212 OFFICE O/P EST SF 10 MIN: CPT | Mod: TH

## 2023-12-23 LAB
SURGICAL PATHOLOGY STUDY: SIGNIFICANT CHANGE UP
SURGICAL PATHOLOGY STUDY: SIGNIFICANT CHANGE UP

## 2023-12-27 NOTE — HISTORY OF PRESENT ILLNESS
[Delivery Date: ___] : on [unfilled] [Repeat C/S] : delivered by  section (repeat) [Female] : Delivery History: baby girl [Wt. ___] : weighing [unfilled] [None] : The patient is currently asymptomatic [Clean/Dry/Intact] : clean, dry and intact [Doing Well] : is doing well [No Sign of Infection] : is showing no signs of infection [Excellent Pain Control] : has excellent pain control [Erythema] : not erythematous [Swelling] : not swollen [Dehiscence] : not dehisced [Healed] : not healed [FreeTextEntry8] : incision check [FreeTextEntry9] : pt was evalauted post partum in the ER for hyptn, she was prescribed procardia 30mg daily at that time. [de-identified] : 39 weeks  [de-identified] : breast and bottle [FreeTextEntry1] : 32 y/o F presenting for incision check: -Incision healing well -BP WNL in office today. Patient denies any headache, blurry vision, or ruq pain. Patient to continue taking Procardia 30mg. Referral given for cardiology consult due to post partum hypertension -f/u for 6 week PP visit

## 2024-01-18 ENCOUNTER — APPOINTMENT (OUTPATIENT)
Dept: OBGYN | Facility: CLINIC | Age: 34
End: 2024-01-18
Payer: MEDICAID

## 2024-01-18 VITALS
WEIGHT: 198 LBS | SYSTOLIC BLOOD PRESSURE: 116 MMHG | BODY MASS INDEX: 36.44 KG/M2 | HEIGHT: 62 IN | DIASTOLIC BLOOD PRESSURE: 72 MMHG

## 2024-01-18 PROCEDURE — 99213 OFFICE O/P EST LOW 20 MIN: CPT | Mod: TH,24

## 2024-01-19 NOTE — HISTORY OF PRESENT ILLNESS
[Postpartum Follow Up] : postpartum follow up [Complications:___] : complications include: [unfilled] [Delivery Date: ___] : on [unfilled] [Repeat C/S] : delivered by  section (repeat) [Female] : Delivery History: baby girl [Wt. ___] : weighing [unfilled] [Breastfeeding] : currently nursing [None] : The patient is currently asymptomatic [FreeTextEntry8] : Patient presenting for PP visit. PP course c/b PEC currently on Procardia 30, has been having some low BP. Had echo and ekg which patient states were all wnl [de-identified] : + bilateral salpingectomy. Baby Payton. PP sPEC [FreeTextEntry1] : 32 y/o F s/p C/S presenting for postpartum visit  -incision healing well -BP wnl and sometimes low - patient given hold parameters for Procardia -patient seeing lactation consultant - milk supply dropped while baby being worked up for thyroid issue -Patient cleared to resume normal activity (sexual intercourse and exercise) - referral given for [facility] pelvic floor therapy  -f/u for annual in April

## 2024-01-24 ENCOUNTER — NON-APPOINTMENT (OUTPATIENT)
Age: 34
End: 2024-01-24

## 2024-02-08 ENCOUNTER — TRANSCRIPTION ENCOUNTER (OUTPATIENT)
Age: 34
End: 2024-02-08

## 2024-03-04 NOTE — ED ADULT NURSE REASSESSMENT NOTE - TEMPERATURE IN CELSIUS (DEGREES C)
-- DO NOT REPLY / DO NOT REPLY ALL --  -- Message is from Engagement Center Operations (ECO) --    General Patient Message: patient needs to schedule her annual physical she hasn't had one since 9/21 and nothings available earlier this month or sooner than September, please call patient to schedule     Caller Information         Type Contact Phone/Fax    03/04/2024 12:24 PM CST Phone (Incoming) Maira Summers (Self) [phone] (M)          Alternative phone number: n/a    Can a detailed message be left? Yes    Message Turnaround:     Is it Working Hours? Yes - Working Hours                     
36.5

## 2024-03-13 NOTE — OB PST NOTE - TERM DELIVERIES, OB PROFILE
Physical Therapy    Patient not seen in therapy.     Discontinue therapy: patient request    Per observation and per patient report, patient has been up independently in room. Patient stating no concerns with her strength, mobility, or ability to safely discharge home. Recommendation is for patient to continue with her outpatient therapy services once discharged from hospital. Patient agreeable to discharge from PT 3/13, education provided on should anything change, therapy services can be resumed.        OBJECTIVE                         Documented in the chart in the following areas: Assessment/Plan.        Therapy procedure time and total treatment time can be found documented on the Time Entry flowsheet   1

## 2024-04-09 NOTE — OB RN DELIVERY SUMMARY - NS_INTRAPARTUMABXTYPE_OBGYN_ALL_OB
Hyponatremia   WHAT YOU NEED TO KNOW:   Hyponatremia occurs when the amount of sodium (salt) in your blood is lower than normal. Sodium is an electrolyte (mineral) that helps your muscles, heart, and digestive system work properly. It helps control blood pressure and fluid balance.  WHILE YOU ARE HERE:   Informed consent  is a legal document that explains the tests, treatments, or procedures that you may need. Informed consent means you understand what will be done and can make decisions about what you want. You give your permission when you sign the consent form. You can have someone sign this form for you if you are not able to sign it. You have the right to understand your medical care in words you know. Before you sign the consent form, understand the risks and benefits of what will be done. Make sure all your questions are answered.   An IV  is a small tube placed in your vein that is used to give you medicine or liquids.   Telemetry  is continuous monitoring of your heart rhythm. Sticky pads placed on your skin connect to an EKG machine that records your heart rhythm.  Nutrition:  A dietitian may talk to you about foods you should eat to increase the amount of sodium in your body. The dietitian may also plan a diet for you if you have other diseases, such as kidney or liver disease.  Liquids:  Follow your healthcare provider's advice if you must limit the amount of liquid you drink. Healthcare providers may limit the amount of plain water you drink if you are retaining water. Plain water can lower the sodium in your blood and make your hyponatremia worse. You may be given liquids that have water, sugar, and salt, such as juices, milk, or sports drinks.  Medicines:   Anticonvulsant medicine  is given to control seizures.    Antinausea medicine  helps calm your stomach and prevents vomiting.    Diuretics  help get rid of extra fluid in your body. You may urinate more often when taking  diuretics.    Sodium-retaining medicines  help get rid of extra fluid in your body while sodium stays inside your body.    Tests:   Blood tests  will be done to check the level of sodium in your blood. They may also be done to find the cause of your hyponatremia.    Urine sodium  is a test that checks the level of sodium in your urine. A sample of your urine is collected and is sent to a lab for tests.    A chest x-ray  is used to check your heart and lung function. Healthcare providers may use the x-ray to look for the cause of your hyponatremia.    Treatment:  Dialysis may be needed if medicines cannot decrease extra water in your body and your kidneys are not working well.  RISKS:   Treatment for hyponatremia may cause unpleasant side effects. Medicines may cause nausea, vomiting, low blood pressure, trouble breathing, fever, or a skin rash. Without treatment, hyponatremia may cause serious problems, such as brain swelling, heart problems, or a coma. These problems can be life-threatening.  CARE AGREEMENT:   You have the right to help plan your care. Learn about your health condition and how it may be treated. Discuss treatment options with your healthcare providers to decide what care you want to receive. You always have the right to refuse treatment.   © Copyright [employer] 2023 Information is for End User's use only and may not be sold, redistributed or otherwise used for commercial purposes.  The above information is an  only. It is not intended as medical advice for individual conditions or treatments. Talk to your doctor, nurse or pharmacist before following any medical regimen to see if it is safe and effective for you.     No antibiotics or any antibiotics < 2 hrs prior to birth

## 2024-04-18 ENCOUNTER — OFFICE (OUTPATIENT)
Dept: URBAN - METROPOLITAN AREA CLINIC 63 | Facility: CLINIC | Age: 34
Setting detail: OPHTHALMOLOGY
End: 2024-04-18
Payer: COMMERCIAL

## 2024-04-18 DIAGNOSIS — Z79.899: ICD-10-CM

## 2024-04-18 PROCEDURE — 99213 OFFICE O/P EST LOW 20 MIN: CPT | Performed by: STUDENT IN AN ORGANIZED HEALTH CARE EDUCATION/TRAINING PROGRAM

## 2024-05-28 PROBLEM — Z22.7 LATENT TUBERCULOSIS BY BLOOD TEST: Status: ACTIVE | Noted: 2024-05-28

## 2024-05-29 ENCOUNTER — APPOINTMENT (OUTPATIENT)
Dept: INFECTIOUS DISEASE | Facility: CLINIC | Age: 34
End: 2024-05-29
Payer: MEDICAID

## 2024-05-29 VITALS
BODY MASS INDEX: 34.6 KG/M2 | HEART RATE: 76 BPM | TEMPERATURE: 97.9 F | HEIGHT: 62 IN | OXYGEN SATURATION: 99 % | SYSTOLIC BLOOD PRESSURE: 116 MMHG | DIASTOLIC BLOOD PRESSURE: 64 MMHG | WEIGHT: 188 LBS

## 2024-05-29 DIAGNOSIS — M13.80 OTHER SPECIFIED ARTHRITIS, UNSPECIFIED SITE: ICD-10-CM

## 2024-05-29 DIAGNOSIS — Z22.7 LATENT TUBERCULOSIS: ICD-10-CM

## 2024-05-29 PROCEDURE — 99205 OFFICE O/P NEW HI 60 MIN: CPT

## 2024-05-29 RX ORDER — RIFAMPIN 300 MG/1
300 CAPSULE ORAL
Qty: 60 | Refills: 3 | Status: ACTIVE | COMMUNITY
Start: 2024-05-29 | End: 1900-01-01

## 2024-05-29 RX ORDER — BACILLUS COAGULANS/INULIN 1B-250 MG
CAPSULE ORAL
Refills: 0 | Status: ACTIVE | COMMUNITY

## 2024-05-29 RX ORDER — DEXTROMETHORPHAN POLISTIREX 30 MG/5 ML
SUSPENSION, EXTENDED RELEASE 12 HR ORAL
Refills: 0 | Status: ACTIVE | COMMUNITY

## 2024-05-29 RX ORDER — CALCIUM CARBONATE/VITAMIN D3 600 MG-10
TABLET ORAL
Refills: 0 | Status: ACTIVE | COMMUNITY

## 2024-05-29 RX ORDER — ELECTROLYTES/DEXTROSE
SOLUTION, ORAL ORAL
Refills: 0 | Status: ACTIVE | COMMUNITY

## 2024-05-29 NOTE — ASSESSMENT
[FreeTextEntry1] :  Patient clearly has latent tuberculosis based on exposure history as a child PPD positivity in 2005 and QuantiFERON gold +2023 and most recently in her rheumatologist office.  I do not have the most recent labs but I do not expect it to be any different than what is in the chart I discussed with the patient that regardless of immune status at her age she should be treated for latent tuberculosis as she has increased risk of developing active tuberculosis over her lifetime and this is recommendation.  In addition patient has what appears to be seronegative rheumatoid arthritis under the care of rheumatologist and it is possible that she might require Biologics in the future if she should have a change in her condition.  For that reason alone I discussed with her that I think it is imperative that she get treated for latent tuberculosis.  I discussed with patient treatment of rifampin 600 mg a day for 4 months with the known side effects of red urine and tears.  Patient is not a user of alcohol and I see no contraindications for treatment.  Patient agreed and will start treatment.  [facility] she will follow-up here 2 months until therapy is completed.  At that point I discussed with her that her blood test will always remain positive but that her latent tuberculosis which I described is less than 100 organisms in her body without any evidence of active disease on chest x-ray will be considered cured and it will be safe to give her Biologics All issues regarding patient's health and medical problems have been discussed. The patient understands and concurs with the treatment plan. [Treatment Education] : treatment education [Treatment Adherence] : treatment adherence [Rx Dose / Side Effects] : Rx dose/side effects [Risk Reduction] : risk reduction [Drug Interactions / Side Effects] : drug interactions/side effects [Disclosure of Diagnosis] : disclosure of diagnosis

## 2024-05-29 NOTE — CONSULT LETTER
[Dear  ___] : Dear  [unfilled], [Consult Letter:] : I had the pleasure of evaluating your patient, [unfilled]. [Please see my note below.] : Please see my note below. [Consult Closing:] : Thank you very much for allowing me to participate in the care of this patient.  If you have any questions, please do not hesitate to contact me. [Sincerely,] : Sincerely, [FreeTextEntry3] : Benito Russell MD FACP Diplomates American Board of Internal Medicine and Infectious Diseases [facility] [place] [DrKerri  ___] : Dr. MONDRAGON

## 2024-05-29 NOTE — HISTORY OF PRESENT ILLNESS
[FreeTextEntry1] : Patient is a very pleasant 33-year-old  woman who is referred here because of a positive serum QuantiFERON gold.  She is born in the [place] came [place] in [place].  She states that when she was approximately 6 years old her next-door neighbor had active tuberculosis and she was frequently in that woman's house.  Her cousin also was recently found to have positive QuantiFERON gold test Patient was tested in 2005 when she came here had a positive PPD but was not offered treatment.  Patient has what appears to be seronegative rheumatoid arthritis maintained on Plaquenil doing well.  She recently had the repeat blood work done and is coming here for treatment.  Patient is HIV negative has no risks is  with children.  She does not breast-feed.  She is not on birth control pills that she had a tubal ligation after the last recent pregnancy and delivery. She is otherwise asymptomatic

## 2024-06-12 NOTE — OB RN PATIENT PROFILE - SPIRITUAL CULTURAL, RELIGIOUS PRACTICES/VALUES, PROFILE
Ambulatory Care Coordination Note     6/12/2024 2:30 PM     patient outreach attempt by this AC today to offer care management services. [facility] was unable to reach the patient by telephone today; left voice message requesting a return phone call to this ACM., if he would like to continue program.     Patient closed (unable to reach patient) from the High Risk Care Management program on 6/12/2024.  Patient  was encouraged to call AC if he would like to participate in care management program in the future .  No further Ambulatory Care Manager follow up scheduled.     Maribeth Sepulveda RN BSN  [phone]        
None

## 2024-06-13 NOTE — PLAN
Care Transitions Weekly Telephone Call Attempt    Called patient for week 2 check-in with Care Transitions Program. 1st attempt. Left message. Will try again at a later time.     Inocencio Huertas RN, BSN  Care Transition Nurse  [place] #[phone]  [place]  #[phone]    [FreeTextEntry1] : 32 year old P2 presenting with amenorrhea.\par -f/u PAP and GC/CT done today\par -f/u prenatal blood work drawn today\par -Rx sent for PNV\par -f/u 2 weeks for confirmation of pregnancy\par

## 2024-07-22 ENCOUNTER — APPOINTMENT (OUTPATIENT)
Dept: OBGYN | Facility: CLINIC | Age: 34
End: 2024-07-22

## 2024-07-24 ENCOUNTER — TRANSCRIPTION ENCOUNTER (OUTPATIENT)
Age: 34
End: 2024-07-24

## 2024-07-30 NOTE — HISTORY OF PRESENT ILLNESS
[FreeTextEntry1] : Patient is a very pleasant 33-year-old  woman who is referred here because of a positive serum QuantiFERON gold.  She is born in the [place] came [place] in [place].  She states that when she was approximately 6 years old her next-door neighbor had active tuberculosis and she was frequently in that woman's house.  Her cousin also was recently found to have positive QuantiFERON gold test Patient was tested in 2005 when she came here had a positive PPD but was not offered treatment.  Patient has what appears to be seronegative rheumatoid arthritis maintained on Plaquenil doing well.  She recently had the repeat blood work done and is coming here for treatment.  Patient is HIV negative has no risks is  with children.  She does not breast-feed.  She is not on birth control pills that she had a tubal ligation after the last recent pregnancy and delivery. She is otherwise asymptomatic  [phone]

## 2024-07-31 ENCOUNTER — NON-APPOINTMENT (OUTPATIENT)
Age: 34
End: 2024-07-31

## 2024-07-31 ENCOUNTER — APPOINTMENT (OUTPATIENT)
Dept: INFECTIOUS DISEASE | Facility: CLINIC | Age: 34
End: 2024-07-31

## 2024-07-31 DIAGNOSIS — Z22.7 LATENT TUBERCULOSIS: ICD-10-CM

## 2024-07-31 DIAGNOSIS — M13.80 OTHER SPECIFIED ARTHRITIS, UNSPECIFIED SITE: ICD-10-CM

## 2024-10-02 NOTE — REVIEW OF SYSTEMS
[Negative] : Breast MEDICATIONS  (PRN):  acetaminophen     Tablet .. 650 milliGRAM(s) Oral every 6 hours PRN Mild Pain (1 - 3), Moderate Pain (4 - 6)  aluminum hydroxide/magnesium hydroxide/simethicone Suspension 30 milliLiter(s) Oral every 4 hours PRN Dyspepsia  haloperidol    Injectable 5 milliGRAM(s) IntraMuscular once PRN severe psychotic agitation  LORazepam     Tablet 1 milliGRAM(s) Oral every 4 hours PRN Insomnia/anxiety  LORazepam     Tablet 2 milliGRAM(s) Oral every 6 hours PRN agitation  LORazepam   Injectable 2 milliGRAM(s) IntraMuscular once PRN severe agitation  melatonin. 3 milliGRAM(s) Oral at bedtime PRN Insomnia  polyethylene glycol 3350 17 Gram(s) Oral daily PRN constipation  propranolol 10 milliGRAM(s) Oral three times a day PRN Restlessness

## 2025-01-12 ENCOUNTER — OFFICE (OUTPATIENT)
Dept: URBAN - METROPOLITAN AREA CLINIC 63 | Facility: CLINIC | Age: 35
Setting detail: OPHTHALMOLOGY
End: 2025-01-12
Payer: COMMERCIAL

## 2025-01-12 DIAGNOSIS — Z79.899: ICD-10-CM

## 2025-01-12 DIAGNOSIS — H11.153: ICD-10-CM

## 2025-01-12 PROCEDURE — 92014 COMPRE OPH EXAM EST PT 1/>: CPT | Performed by: STUDENT IN AN ORGANIZED HEALTH CARE EDUCATION/TRAINING PROGRAM

## 2025-01-12 PROCEDURE — 92083 EXTENDED VISUAL FIELD XM: CPT | Performed by: STUDENT IN AN ORGANIZED HEALTH CARE EDUCATION/TRAINING PROGRAM

## 2025-01-12 PROCEDURE — 92134 CPTRZ OPH DX IMG PST SGM RTA: CPT | Performed by: STUDENT IN AN ORGANIZED HEALTH CARE EDUCATION/TRAINING PROGRAM

## 2025-01-12 ASSESSMENT — KERATOMETRY
OS_K2POWER_DIOPTERS: 45.25
OS_K1POWER_DIOPTERS: 45.00
OD_K2POWER_DIOPTERS: 45.00
OD_K1POWER_DIOPTERS: 44.75
OD_AXISANGLE_DEGREES: 121
OS_AXISANGLE_DEGREES: 09

## 2025-01-12 ASSESSMENT — REFRACTION_AUTOREFRACTION
OD_SPHERE: -1.25
OS_AXIS: 088
OS_CYLINDER: -0.50
OD_CYLINDER: -0.75
OS_SPHERE: -1.75
OD_AXIS: 086

## 2025-01-12 ASSESSMENT — VISUAL ACUITY
OD_BCVA: 20/20
OS_BCVA: 20/20

## 2025-01-12 ASSESSMENT — CONFRONTATIONAL VISUAL FIELD TEST (CVF)
OS_FINDINGS: FULL
OD_FINDINGS: FULL

## 2025-01-12 ASSESSMENT — TONOMETRY: OS_IOP_MMHG: 20

## 2025-02-16 NOTE — ED ADULT TRIAGE NOTE - SOURCE OF INFORMATION
Signs Of Vitality Reviewed: Yes    IMPORTANT EVENTS THIS SHIFT:     IMPORTANT EVENTS COMING UP/GOALS (PLEASE INCLUDE WHITE BOARD AND DISCHARGE BOARD UPDATES):   PATIENT SPECIAL NEEDS/ACCOMMODATIONS:                Patient

## 2025-08-26 ENCOUNTER — RX ONLY (RX ONLY)
Age: 35
End: 2025-08-26

## 2025-08-26 ENCOUNTER — OFFICE (OUTPATIENT)
Dept: URBAN - METROPOLITAN AREA CLINIC 104 | Facility: CLINIC | Age: 35
Setting detail: OPHTHALMOLOGY
End: 2025-08-26
Payer: COMMERCIAL

## 2025-08-26 DIAGNOSIS — H52.13: ICD-10-CM

## 2025-08-26 DIAGNOSIS — H43.393: ICD-10-CM

## 2025-08-26 DIAGNOSIS — H11.153: ICD-10-CM

## 2025-08-26 PROCEDURE — 92015 DETERMINE REFRACTIVE STATE: CPT | Performed by: OPTOMETRIST

## 2025-08-26 PROCEDURE — 92012 INTRM OPH EXAM EST PATIENT: CPT | Performed by: OPTOMETRIST

## 2025-08-26 ASSESSMENT — KERATOMETRY
OS_K1POWER_DIOPTERS: 44.94
OD_K1POWER_DIOPTERS: 44.70
OD_AXISANGLE_DEGREES: 101
OS_K2POWER_DIOPTERS: 45.30
OS_AXISANGLE_DEGREES: 30
OD_K2POWER_DIOPTERS: 45.18

## 2025-08-26 ASSESSMENT — REFRACTION_AUTOREFRACTION
OD_AXIS: 81
OS_AXIS: 81
OS_SPHERE: -1.25
OS_CYLINDER: -0.75
OD_CYLINDER: -1.00
OD_SPHERE: -1.25

## 2025-08-26 ASSESSMENT — REFRACTION_MANIFEST
OS_AXIS: 090
OD_VA1: 20/20
OS_SPHERE: -1.75
OS_CYLINDER: -0.50
OS_VA1: 20/20
OD_CYLINDER: -0.50
OD_SPHERE: -1.75
OD_AXIS: 090

## 2025-08-26 ASSESSMENT — REFRACTION_CURRENTRX
OD_AXIS: 91
OD_CYLINDER: -0.50
OS_CYLINDER: -0.25
OS_AXIS: 91
OD_OVR_VA: 20/
OD_SPHERE: -1.75
OS_OVR_VA: 20/
OS_SPHERE: -1.75

## 2025-08-26 ASSESSMENT — CONFRONTATIONAL VISUAL FIELD TEST (CVF)
OD_FINDINGS: FULL
OS_FINDINGS: FULL

## 2025-08-26 ASSESSMENT — VISUAL ACUITY
OS_BCVA: 20/20
OD_BCVA: 20/20

## (undated) DEVICE — LIGASURE ATLAS 10MM 20CM